# Patient Record
Sex: MALE | Race: WHITE | Employment: OTHER | URBAN - METROPOLITAN AREA
[De-identification: names, ages, dates, MRNs, and addresses within clinical notes are randomized per-mention and may not be internally consistent; named-entity substitution may affect disease eponyms.]

---

## 2019-05-17 ENCOUNTER — HOSPITAL ENCOUNTER (OUTPATIENT)
Age: 59
Setting detail: OBSERVATION
Discharge: HOME | End: 2019-05-19
Attending: EMERGENCY MEDICINE | Admitting: HOSPITALIST
Payer: OTHER GOVERNMENT

## 2019-05-17 ENCOUNTER — APPOINTMENT (OUTPATIENT)
Dept: GENERAL RADIOLOGY | Age: 59
End: 2019-05-17
Payer: OTHER GOVERNMENT

## 2019-05-17 ENCOUNTER — APPOINTMENT (OUTPATIENT)
Dept: CT IMAGING | Age: 59
End: 2019-05-17
Payer: OTHER GOVERNMENT

## 2019-05-17 DIAGNOSIS — R56.9 NEW ONSET SEIZURE (HCC): Primary | ICD-10-CM

## 2019-05-17 DIAGNOSIS — I69.30 HISTORY OF CVA WITH RESIDUAL DEFICIT: ICD-10-CM

## 2019-05-17 DIAGNOSIS — R55 SYNCOPE AND COLLAPSE: ICD-10-CM

## 2019-05-17 LAB
A/G RATIO: 1.2 (ref 1.1–2.2)
ALBUMIN SERPL-MCNC: 3.9 G/DL (ref 3.4–5)
ALP BLD-CCNC: 99 U/L (ref 40–129)
ALT SERPL-CCNC: 15 U/L (ref 10–40)
ANION GAP SERPL CALCULATED.3IONS-SCNC: 22 MMOL/L (ref 3–16)
APTT: 33.1 SEC (ref 26–36)
AST SERPL-CCNC: 15 U/L (ref 15–37)
BASOPHILS ABSOLUTE: 0.1 K/UL (ref 0–0.2)
BASOPHILS RELATIVE PERCENT: 0.8 %
BILIRUB SERPL-MCNC: 0.3 MG/DL (ref 0–1)
BILIRUBIN URINE: NEGATIVE
BLOOD, URINE: NEGATIVE
BUN BLDV-MCNC: 4 MG/DL (ref 7–20)
CALCIUM SERPL-MCNC: 10.2 MG/DL (ref 8.3–10.6)
CHLORIDE BLD-SCNC: 104 MMOL/L (ref 99–110)
CLARITY: CLEAR
CO2: 16 MMOL/L (ref 21–32)
COLOR: YELLOW
CREAT SERPL-MCNC: 1.1 MG/DL (ref 0.9–1.3)
EOSINOPHILS ABSOLUTE: 0.1 K/UL (ref 0–0.6)
EOSINOPHILS RELATIVE PERCENT: 2.1 %
GFR AFRICAN AMERICAN: >60
GFR NON-AFRICAN AMERICAN: >60
GLOBULIN: 3.3 G/DL
GLUCOSE BLD-MCNC: 122 MG/DL (ref 70–99)
GLUCOSE URINE: NEGATIVE MG/DL
HCT VFR BLD CALC: 47.7 % (ref 40.5–52.5)
HEMOGLOBIN: 16 G/DL (ref 13.5–17.5)
INR BLD: 0.97 (ref 0.86–1.14)
KETONES, URINE: NEGATIVE MG/DL
LACTIC ACID: 1.7 MMOL/L (ref 0.4–2)
LEUKOCYTE ESTERASE, URINE: NEGATIVE
LYMPHOCYTES ABSOLUTE: 3.2 K/UL (ref 1–5.1)
LYMPHOCYTES RELATIVE PERCENT: 47.6 %
MAGNESIUM: 2.1 MG/DL (ref 1.8–2.4)
MAGNESIUM: 2.1 MG/DL (ref 1.8–2.4)
MCH RBC QN AUTO: 30.4 PG (ref 26–34)
MCHC RBC AUTO-ENTMCNC: 33.6 G/DL (ref 31–36)
MCV RBC AUTO: 90.4 FL (ref 80–100)
MICROSCOPIC EXAMINATION: NORMAL
MONOCYTES ABSOLUTE: 0.5 K/UL (ref 0–1.3)
MONOCYTES RELATIVE PERCENT: 7.5 %
NEUTROPHILS ABSOLUTE: 2.8 K/UL (ref 1.7–7.7)
NEUTROPHILS RELATIVE PERCENT: 42 %
NITRITE, URINE: NEGATIVE
PDW BLD-RTO: 14 % (ref 12.4–15.4)
PH UA: 6 (ref 5–8)
PLATELET # BLD: 272 K/UL (ref 135–450)
PMV BLD AUTO: 8.6 FL (ref 5–10.5)
POTASSIUM REFLEX MAGNESIUM: 3.5 MMOL/L (ref 3.5–5.1)
PROTEIN UA: NEGATIVE MG/DL
PROTHROMBIN TIME: 11.1 SEC (ref 9.8–13)
RBC # BLD: 5.27 M/UL (ref 4.2–5.9)
SODIUM BLD-SCNC: 142 MMOL/L (ref 136–145)
SPECIFIC GRAVITY UA: 1.01 (ref 1–1.03)
TOTAL PROTEIN: 7.2 G/DL (ref 6.4–8.2)
TROPONIN: <0.01 NG/ML
URINE REFLEX TO CULTURE: NORMAL
URINE TYPE: NORMAL
UROBILINOGEN, URINE: 0.2 E.U./DL
WBC # BLD: 6.8 K/UL (ref 4–11)

## 2019-05-17 PROCEDURE — 80053 COMPREHEN METABOLIC PANEL: CPT

## 2019-05-17 PROCEDURE — 85025 COMPLETE CBC W/AUTO DIFF WBC: CPT

## 2019-05-17 PROCEDURE — 93005 ELECTROCARDIOGRAM TRACING: CPT | Performed by: EMERGENCY MEDICINE

## 2019-05-17 PROCEDURE — G0378 HOSPITAL OBSERVATION PER HR: HCPCS

## 2019-05-17 PROCEDURE — 85730 THROMBOPLASTIN TIME PARTIAL: CPT

## 2019-05-17 PROCEDURE — 84484 ASSAY OF TROPONIN QUANT: CPT

## 2019-05-17 PROCEDURE — 36415 COLL VENOUS BLD VENIPUNCTURE: CPT

## 2019-05-17 PROCEDURE — 83735 ASSAY OF MAGNESIUM: CPT

## 2019-05-17 PROCEDURE — 83605 ASSAY OF LACTIC ACID: CPT

## 2019-05-17 PROCEDURE — 70450 CT HEAD/BRAIN W/O DYE: CPT

## 2019-05-17 PROCEDURE — 96360 HYDRATION IV INFUSION INIT: CPT

## 2019-05-17 PROCEDURE — 2580000003 HC RX 258: Performed by: NURSE PRACTITIONER

## 2019-05-17 PROCEDURE — 85610 PROTHROMBIN TIME: CPT

## 2019-05-17 PROCEDURE — 6370000000 HC RX 637 (ALT 250 FOR IP): Performed by: NURSE PRACTITIONER

## 2019-05-17 PROCEDURE — 73562 X-RAY EXAM OF KNEE 3: CPT

## 2019-05-17 PROCEDURE — 83036 HEMOGLOBIN GLYCOSYLATED A1C: CPT

## 2019-05-17 PROCEDURE — 2580000003 HC RX 258: Performed by: PHYSICIAN ASSISTANT

## 2019-05-17 PROCEDURE — 71046 X-RAY EXAM CHEST 2 VIEWS: CPT

## 2019-05-17 PROCEDURE — 81003 URINALYSIS AUTO W/O SCOPE: CPT

## 2019-05-17 PROCEDURE — 80061 LIPID PANEL: CPT

## 2019-05-17 PROCEDURE — 99285 EMERGENCY DEPT VISIT HI MDM: CPT

## 2019-05-17 RX ORDER — CLOPIDOGREL BISULFATE 75 MG/1
75 TABLET ORAL DAILY
Status: DISCONTINUED | OUTPATIENT
Start: 2019-05-18 | End: 2019-05-19 | Stop reason: HOSPADM

## 2019-05-17 RX ORDER — NITROGLYCERIN 0.4 MG/1
0.4 TABLET SUBLINGUAL EVERY 5 MIN PRN
COMMUNITY
Start: 2010-05-05

## 2019-05-17 RX ORDER — ASPIRIN 81 MG/1
81 TABLET, CHEWABLE ORAL DAILY
COMMUNITY
Start: 2010-05-05

## 2019-05-17 RX ORDER — SODIUM CHLORIDE 0.9 % (FLUSH) 0.9 %
10 SYRINGE (ML) INJECTION PRN
Status: DISCONTINUED | OUTPATIENT
Start: 2019-05-17 | End: 2019-05-19 | Stop reason: HOSPADM

## 2019-05-17 RX ORDER — ATORVASTATIN CALCIUM 20 MG/1
20 TABLET, FILM COATED ORAL NIGHTLY
Status: DISCONTINUED | OUTPATIENT
Start: 2019-05-17 | End: 2019-05-18

## 2019-05-17 RX ORDER — CLOPIDOGREL BISULFATE 75 MG/1
1 TABLET ORAL DAILY
COMMUNITY

## 2019-05-17 RX ORDER — ACETAMINOPHEN 325 MG/1
650 TABLET ORAL EVERY 4 HOURS PRN
Status: DISCONTINUED | OUTPATIENT
Start: 2019-05-17 | End: 2019-05-19 | Stop reason: HOSPADM

## 2019-05-17 RX ORDER — 0.9 % SODIUM CHLORIDE 0.9 %
1000 INTRAVENOUS SOLUTION INTRAVENOUS ONCE
Status: COMPLETED | OUTPATIENT
Start: 2019-05-17 | End: 2019-05-17

## 2019-05-17 RX ORDER — METOPROLOL TARTRATE 50 MG/1
50 TABLET, FILM COATED ORAL 2 TIMES DAILY
Status: DISCONTINUED | OUTPATIENT
Start: 2019-05-17 | End: 2019-05-19 | Stop reason: HOSPADM

## 2019-05-17 RX ORDER — ASPIRIN 81 MG/1
81 TABLET, CHEWABLE ORAL DAILY
Status: DISCONTINUED | OUTPATIENT
Start: 2019-05-18 | End: 2019-05-19 | Stop reason: HOSPADM

## 2019-05-17 RX ORDER — ONDANSETRON 2 MG/ML
4 INJECTION INTRAMUSCULAR; INTRAVENOUS EVERY 6 HOURS PRN
Status: DISCONTINUED | OUTPATIENT
Start: 2019-05-17 | End: 2019-05-19 | Stop reason: HOSPADM

## 2019-05-17 RX ORDER — SODIUM CHLORIDE 0.9 % (FLUSH) 0.9 %
10 SYRINGE (ML) INJECTION EVERY 12 HOURS SCHEDULED
Status: DISCONTINUED | OUTPATIENT
Start: 2019-05-17 | End: 2019-05-19 | Stop reason: HOSPADM

## 2019-05-17 RX ADMIN — ATORVASTATIN CALCIUM 20 MG: 20 TABLET, FILM COATED ORAL at 23:52

## 2019-05-17 RX ADMIN — Medication 10 ML: at 23:52

## 2019-05-17 RX ADMIN — SODIUM CHLORIDE 1000 ML: 9 INJECTION, SOLUTION INTRAVENOUS at 20:02

## 2019-05-17 RX ADMIN — METOPROLOL TARTRATE 50 MG: 50 TABLET ORAL at 23:52

## 2019-05-17 ASSESSMENT — PAIN DESCRIPTION - ORIENTATION: ORIENTATION: LEFT

## 2019-05-17 ASSESSMENT — PAIN DESCRIPTION - FREQUENCY: FREQUENCY: CONTINUOUS

## 2019-05-17 ASSESSMENT — PAIN DESCRIPTION - PAIN TYPE: TYPE: ACUTE PAIN

## 2019-05-17 ASSESSMENT — PAIN DESCRIPTION - DESCRIPTORS: DESCRIPTORS: ACHING;CONSTANT

## 2019-05-17 ASSESSMENT — PAIN DESCRIPTION - ONSET: ONSET: ON-GOING

## 2019-05-17 ASSESSMENT — PAIN SCALES - GENERAL
PAINLEVEL_OUTOF10: 0
PAINLEVEL_OUTOF10: 5

## 2019-05-17 ASSESSMENT — PAIN DESCRIPTION - LOCATION: LOCATION: KNEE

## 2019-05-17 NOTE — ED PROVIDER NOTES
room.  Mikaela Alvarenga is one who found the patient. Roommate states he heard the thud on the floor he went immediately to attend the patient. Patient was seizing. Patient went to call EMS. Upon returning the seizing activity had diminished. He was frothing from the right mouth and the patient rolled him on the right side for airway protection. History of seizure disorder. Nursing Notes were all reviewed and agreed with or any disagreements were addressed  in the HPI. REVIEW OF SYSTEMS    (2-9 systems for level 4, 10 or more for level 5)     Review of Systems    Positives and Pertinent negatives as per HPI. Except as noted abovein the ROS, all other systems were reviewed and negative. PAST MEDICAL HISTORY     Past Medical History:   Diagnosis Date    CAD (coronary artery disease)     CVA [facility]cerebral vascular accident) ([facility] [address]     Nicotine addiction          SURGICAL HISTORY     Past Surgical History:   Procedure Laterality Date    CARDIAC CATHETERIZATION  2010         [address]       Current Discharge Medication List      CONTINUE these medications which have NOT CHANGED    Details   aspirin 81 MG chewable tablet Take 81 mg by mouth daily      clopidogrel (PLAVIX) 75 MG tablet Take 1 tablet by mouth daily       SERTRALINE HCL PO Take 1 tablet by mouth daily      Cholecalciferol (VITAMIN D PO) Take 1 tablet by mouth daily      METOPROLOL TARTRATE PO Take 1 tablet by mouth 2 times daily      ROSUVASTATIN CALCIUM PO Take 0.5 tablets by mouth      nitroGLYCERIN (NITROSTAT) 0.4 MG SL tablet Place 0.4 mg under the tongue every 5 minutes as needed for Chest pain               ALLERGIES     Patient has no known allergies. FAMILYHISTORY     History reviewed. No pertinent family history.        SOCIAL HISTORY       Social History     Socioeconomic History    Marital status: Single     Spouse name: None    Number of children: None    Years of education: None    Highest education level: None Occupational History    None   Social Needs    Financial resource strain: None    Food insecurity:     Worry: None     Inability: None    Transportation needs:     Medical: None     Non-medical: None   Tobacco Use    Smoking status: Current Every Day Smoker     Packs/day: 1.00    Smokeless tobacco: Never Used   Substance and Sexual Activity    Alcohol use: Yes     Comment: occasionally     Drug use: Never    Sexual activity: Not Currently   Lifestyle    Physical activity:     Days per week: None     Minutes per session: None    Stress: None   Relationships    Social connections:     Talks on phone: None     Gets together: None     Attends [employer] service: None     Active member of club or organization: None     Attends meetings of clubs or organizations: None     Relationship status: None    Intimate partner violence:     Fear of current or ex partner: None     Emotionally abused: None     Physically abused: None     Forced sexual activity: None   Other Topics Concern    None   Social History Narrative    None       SCREENINGS    [place] Coma Scale  Eye Opening: Spontaneous  Best Verbal Response: Oriented  Best Motor Response: Obeys commands  [place] Coma Scale Score: 15        PHYSICAL EXAM    (up to 7 for level 4, 8 or more for level 5)     ED Triage Vitals [05/17/19 1813]   BP Temp Temp src Pulse Resp SpO2 Height Weight   -- -- -- 78 -- -- -- --       Physical Exam   Constitutional: He is oriented to person, place, and time. He appears well-developed and well-nourished. HENT:   Head: Normocephalic and atraumatic. Right Ear: External ear normal.   Left Ear: External ear normal.   Eyes: Conjunctivae are normal. Right eye exhibits no discharge. Left eye exhibits no discharge. No scleral icterus. Neck: Normal range of motion. Neck supple. Normal carotid pulses present. Carotid bruit is not present. Cardiovascular: Normal rate, regular rhythm and normal heart sounds.    Pulmonary/Chest: [address]   Phone [phone]   CBC    Narrative:     Performed at:  [facility]  [address]   Phone [phone]   TSH WITH REFLEX    Narrative:     Performed at:  [facility]  [address]   Phone [phone]   CK    Narrative:     Performed at:  [facility]  [address] [facility] [address]   Phone [phone]       All other labs were within normal range or not returned as of this dictation. EKG: All EKG's are interpreted by the Emergency Department Physician who either signs orCo-signs this chart in the absence of a cardiologist.  Please see their note for interpretation of EKG. RADIOLOGY:   Non-plain film images such as CT, Ultrasound and MRI are read by the radiologist. Lizzy Casanova radiographic images are visualized andpreliminarily interpreted by the  ED Provider with the below findings:    Chest x-ray shows no acute cardiopulmonary process. Head CT scan shows no acute process. X-ray left knee shows no acute osseous abnormality. Interpretation pert Radiologist below, if available at the time of this note:    XR KNEE LEFT (3 VIEWS)   Final Result   No acute findings         CT Head WO Contrast   Final Result   No acute intracranial abnormality. XR CHEST STANDARD (2 VW)   Final Result   No acute findings         NM Cardiac Stress Test Nuclear Imaging    (Results Pending)     No results found.        PROCEDURES   Unless otherwise noted below, none     Procedures    CRITICAL CARE TIME   N/A    CONSULTS:  IP CONSULT TO NEUROLOGY  IP CONSULT TO DIETITIAN  IP CONSULT TO CARDIOLOGY      EMERGENCY DEPARTMENT COURSE and DIFFERENTIALDIAGNOSIS/MDM:   Vitals:    Vitals:    05/18/19 0430 05/18/19 0818 05/18/19 1212 05/18/19 1428   BP: 132/82 129/78 137/87 123/83   Pulse: 74 84 82 66   Resp: 14 16 16 18   Temp: 97.9 °F (36.6 °C) 97.9 °F (36.6 °C)  98 °F (36.7 °C)   TempSrc: Oral Oral  Oral   SpO2: 94% 93%  94%   Weight: 154 lb 8.7 oz (70.1 kg)      Height:           Patient was given thefollowing medications:  Medications   aspirin chewable tablet 81 mg (81 mg Oral Given 5/18/19 0822)   vitamin D (CHOLECALCIFEROL) tablet 1,000 Units (1,000 Units Oral Given 5/18/19 0796)   clopidogrel (PLAVIX) tablet 75 mg (75 mg Oral Given 5/18/19 0822)   metoprolol tartrate (LOPRESSOR) tablet 50 mg (50 mg Oral Given 5/18/19 0822)   sodium chloride flush 0.9 % injection 10 mL (10 mLs Intravenous Given 5/18/19 0825)   sodium chloride flush 0.9 % injection 10 mL (has no administration in time range)   magnesium hydroxide (MILK OF MAGNESIA) 400 MG/5ML suspension 30 mL (has no administration in time range)   ondansetron (ZOFRAN) injection 4 mg (has no administration in time range)   enoxaparin (LOVENOX) injection 40 mg (40 mg Subcutaneous Given 5/18/19 3676)   acetaminophen (TYLENOL) tablet 650 mg (has no administration in time range)   regadenoson (LEXISCAN) injection 0.4 mg (has no administration in time range)   atorvastatin (LIPITOR) tablet 40 mg (has no administration in time range)   0.9 % sodium chloride bolus (0 mLs Intravenous Stopped 5/17/19 2112)       The patient presenting by EMS for witnessed new-onset seizure occurring earlier this evening. Patient without known history of seizure disorder. Patient with known history of CVA occurring August, 2018. He relates the CVA due to possible carotid disease. He does have known CAD with stent placement as well as PVD with bilateral stent placement for lower extremity perfusion. At this time it cannot be clearly determined if the patient has had a TIA event. Laboratory studies show no evidence UTI. The patient's WBC 6.8 hemoglobin 16.0. The patient's CMP shows normal renal and hepatic function. Blood sugar 122. The patient's troponin less than 0.01.   The patient's coag studies are within ranges. The patient magnesium is 2.1. Lactate study pending. Emergency department treatment 1 L saline. I did review case with attending physician who recommends admission for further evaluation of new onset seizure/syncopal event. Cannot exclude TIA at this time. Hospitalist will be contacted for admission. FINAL IMPRESSION      1. New onset seizure ([facility] [address]    2. Syncope and collapse    3. History of CVA with residual deficit          DISPOSITION/PLAN   DISPOSITION Admitted 05/17/2019 10:19:35 PM      PATIENT REFERREDTO:  No follow-up provider specified. DISCHARGE MEDICATIONS:  Current Discharge Medication List          DISCONTINUED MEDICATIONS:  Current Discharge Medication List                 (Please note that portions ofthis note were completed with a voice recognition program.  Efforts were made to edit the dictations but occasionally words are mis-transcribed. )    Suzanna Terrell PA-C (electronically signed)           Suzanna Terrell PA-C  05/18/19 5082

## 2019-05-17 NOTE — ED NOTES
Patient brought in by EMS after being found by roommate unresponsive. Patients roommate says Melanie Gonzalez heard a loud noise in the bathroom and found patient unresponsive on the floor\". Roommate describes when he found him he was having seizure like activity. Patient alert and oriented x 4 with stable vitals. Admits to left knee pain from the fall.       Ajith Monge RN  05/17/19 2199

## 2019-05-17 NOTE — ED NOTES
Bed: A-16  Expected date:   Expected time:   Means of arrival: [facility]  Comments:  Semi-conscious male     Patrai Rubio RN  05/17/19 0032

## 2019-05-18 LAB
ANION GAP SERPL CALCULATED.3IONS-SCNC: 13 MMOL/L (ref 3–16)
BUN BLDV-MCNC: 4 MG/DL (ref 7–20)
CALCIUM SERPL-MCNC: 9.9 MG/DL (ref 8.3–10.6)
CHLORIDE BLD-SCNC: 108 MMOL/L (ref 99–110)
CHOLESTEROL, FASTING: 87 MG/DL (ref 0–199)
CO2: 22 MMOL/L (ref 21–32)
CREAT SERPL-MCNC: 0.7 MG/DL (ref 0.9–1.3)
EKG ATRIAL RATE: 71 BPM
EKG DIAGNOSIS: NORMAL
EKG P AXIS: 64 DEGREES
EKG P-R INTERVAL: 160 MS
EKG Q-T INTERVAL: 400 MS
EKG QRS DURATION: 98 MS
EKG QTC CALCULATION (BAZETT): 434 MS
EKG R AXIS: 74 DEGREES
EKG T AXIS: -43 DEGREES
EKG VENTRICULAR RATE: 71 BPM
ESTIMATED AVERAGE GLUCOSE: 131.2 MG/DL
GFR AFRICAN AMERICAN: >60
GFR NON-AFRICAN AMERICAN: >60
GLUCOSE BLD-MCNC: 100 MG/DL (ref 70–99)
HBA1C MFR BLD: 6.2 %
HCT VFR BLD CALC: 45.2 % (ref 40.5–52.5)
HDLC SERPL-MCNC: 33 MG/DL (ref 40–60)
HEMOGLOBIN: 15.4 G/DL (ref 13.5–17.5)
LDL CHOLESTEROL CALCULATED: 25 MG/DL
MCH RBC QN AUTO: 30.5 PG (ref 26–34)
MCHC RBC AUTO-ENTMCNC: 34.2 G/DL (ref 31–36)
MCV RBC AUTO: 89.2 FL (ref 80–100)
PDW BLD-RTO: 13.8 % (ref 12.4–15.4)
PLATELET # BLD: 254 K/UL (ref 135–450)
PMV BLD AUTO: 8.6 FL (ref 5–10.5)
POTASSIUM REFLEX MAGNESIUM: 3.8 MMOL/L (ref 3.5–5.1)
RBC # BLD: 5.07 M/UL (ref 4.2–5.9)
SODIUM BLD-SCNC: 143 MMOL/L (ref 136–145)
TOTAL CK: 205 U/L (ref 39–308)
TRIGLYCERIDE, FASTING: 147 MG/DL (ref 0–150)
TROPONIN: 0.04 NG/ML
TROPONIN: 0.1 NG/ML
TROPONIN: 0.11 NG/ML
TSH REFLEX: 2.3 UIU/ML (ref 0.27–4.2)
VLDLC SERPL CALC-MCNC: 29 MG/DL
WBC # BLD: 8.1 K/UL (ref 4–11)

## 2019-05-18 PROCEDURE — G0378 HOSPITAL OBSERVATION PER HR: HCPCS

## 2019-05-18 PROCEDURE — 80048 BASIC METABOLIC PNL TOTAL CA: CPT

## 2019-05-18 PROCEDURE — 94760 N-INVAS EAR/PLS OXIMETRY 1: CPT

## 2019-05-18 PROCEDURE — 96372 THER/PROPH/DIAG INJ SC/IM: CPT

## 2019-05-18 PROCEDURE — 6370000000 HC RX 637 (ALT 250 FOR IP): Performed by: INTERNAL MEDICINE

## 2019-05-18 PROCEDURE — 36415 COLL VENOUS BLD VENIPUNCTURE: CPT

## 2019-05-18 PROCEDURE — 93005 ELECTROCARDIOGRAM TRACING: CPT | Performed by: NURSE PRACTITIONER

## 2019-05-18 PROCEDURE — 97162 PT EVAL MOD COMPLEX 30 MIN: CPT

## 2019-05-18 PROCEDURE — 85027 COMPLETE CBC AUTOMATED: CPT

## 2019-05-18 PROCEDURE — 6370000000 HC RX 637 (ALT 250 FOR IP): Performed by: NURSE PRACTITIONER

## 2019-05-18 PROCEDURE — 84443 ASSAY THYROID STIM HORMONE: CPT

## 2019-05-18 PROCEDURE — 6360000002 HC RX W HCPCS: Performed by: NURSE PRACTITIONER

## 2019-05-18 PROCEDURE — 97535 SELF CARE MNGMENT TRAINING: CPT

## 2019-05-18 PROCEDURE — 84484 ASSAY OF TROPONIN QUANT: CPT

## 2019-05-18 PROCEDURE — 82550 ASSAY OF CK (CPK): CPT

## 2019-05-18 PROCEDURE — 97166 OT EVAL MOD COMPLEX 45 MIN: CPT

## 2019-05-18 PROCEDURE — 97116 GAIT TRAINING THERAPY: CPT

## 2019-05-18 PROCEDURE — 2580000003 HC RX 258: Performed by: NURSE PRACTITIONER

## 2019-05-18 PROCEDURE — 99223 1ST HOSP IP/OBS HIGH 75: CPT | Performed by: INTERNAL MEDICINE

## 2019-05-18 PROCEDURE — 93010 ELECTROCARDIOGRAM REPORT: CPT | Performed by: INTERNAL MEDICINE

## 2019-05-18 RX ORDER — ATORVASTATIN CALCIUM 40 MG/1
40 TABLET, FILM COATED ORAL NIGHTLY
Status: DISCONTINUED | OUTPATIENT
Start: 2019-05-18 | End: 2019-05-19 | Stop reason: HOSPADM

## 2019-05-18 RX ADMIN — ATORVASTATIN CALCIUM 40 MG: 40 TABLET, FILM COATED ORAL at 20:31

## 2019-05-18 RX ADMIN — METOPROLOL TARTRATE 50 MG: 50 TABLET ORAL at 20:31

## 2019-05-18 RX ADMIN — VITAMIN D, TAB 1000IU (100/BT) 1000 UNITS: 25 TAB at 08:22

## 2019-05-18 RX ADMIN — CLOPIDOGREL BISULFATE 75 MG: 75 TABLET ORAL at 08:22

## 2019-05-18 RX ADMIN — ASPIRIN 81 MG 81 MG: 81 TABLET ORAL at 08:22

## 2019-05-18 RX ADMIN — METOPROLOL TARTRATE 50 MG: 50 TABLET ORAL at 08:22

## 2019-05-18 RX ADMIN — Medication 10 ML: at 08:25

## 2019-05-18 RX ADMIN — ENOXAPARIN SODIUM 40 MG: 40 INJECTION SUBCUTANEOUS at 08:22

## 2019-05-18 RX ADMIN — Medication 10 ML: at 20:31

## 2019-05-18 ASSESSMENT — PAIN SCALES - GENERAL
PAINLEVEL_OUTOF10: 0
PAINLEVEL_OUTOF10: 0

## 2019-05-18 NOTE — PLAN OF CARE
Problem: Falls - Risk of:  Goal: Will remain free from falls  Description  Will remain free from falls  Outcome: Ongoing  Fall risk precautions in place. Bed in lowest position with wheels locked,bed alarm in place and activated, non-skid socks on pt, fall risk ID on pt, call light in reach, pt encouraged to call before getting out of bed and for any other needs or complaints. Problem: Risk for Impaired Skin Integrity  Goal: Tissue integrity - skin and mucous membranes  Description  Structural intactness and normal physiological function of skin and  mucous membranes. Outcome: Ongoing  Skin assessment completed every shift. Pt assessed for incontinence, appropriate barrier cream applied prn. Pt encouraged to turn/rotate every 2 hours. Assistance provided if pt unable to do so themselves. Problem: Daily Care:  Goal: Daily care needs are met  Description  Daily care needs are met  Outcome: Ongoing  Patient's ability assessed to perform self care and independent activity encouraged according to that ability. Assisted with ADL's as needed. Risk for skin breakdown assessed. Potential discharge needs assessed. Patient and family included in daily care decisions.

## 2019-05-18 NOTE — PROGRESS NOTES
4 Eyes Skin Assessment     The patient is being assess for  Admission    I agree that 2 RN's have performed a thorough Head to Toe Skin Assessment on the patient. ALL assessment sites listed below have been assessed. Areas assessed by both nurses:   [x]   Head, Face, and Ears   [x]   Shoulders, Back, and Chest  [x]   Arms, Elbows, and Hands   [x]   Coccyx, Sacrum, and IschIum  [x]   Legs, Feet, and Heels        Does the Patient have Skin Breakdown?   No         Chandan Prevention initiated: Yes   Wound Care Orders initiated:  NA      [facility] nurse consulted for Pressure Injury (Stage 3,4, Unstageable, DTI, NWPT, and Complex wounds), New and Established Ostomies:  NA      Nurse 1 eSignature: Electronically signed by Marylou Larson RN on 5/18/19 at 12:29 AM    **SHARE this note so that the co-signing nurse is able to place an eSignature**    Nurse 2 eSignature: Electronically signed by Haider Padron RN on 5/18/19 at 12:30 AM

## 2019-05-18 NOTE — PROGRESS NOTES
Occupational Therapy   Occupational Therapy Initial Assessment  Date: 2019   Patient Name: Queenie Negrete  MRN: [id number]     : 1960    Date of Service: 2019    Assessment: Pt is 66 y.o. M who presents following syncope and collapse episode with reported seizure-like activity. PTA pt lives with roommate in [facility] with 4 SIMON. Pt reports independence in self-care and functional mobility with hemiwalker and has assistance from roommate for homemaking responsibilities. Currently, pt presents with ROM/strength in [facility] with functional impairment (little AROM) d/t previous CVA in 2018. Pt completed bed mobility with SBA and sit <> stand transfers with SBA/CGA. Pt completed toileting and grooming tasks with SBA and increased time. Pt completed functional mobility with QC with SBA/CGA. Anticipate pt is functioning close to baseline - benefiting from [facility] for ADL needs upon d/c. Pt would benefit from continued skilled therapy in outpatient setting to address deficits from CVA. Anticipate pt to d/c home with prior level of assistance from roommate. Discharge Recommendations:  Home with assist PRN, Outpatient OT       Assessment   Performance deficits / Impairments: Decreased functional mobility ; Decreased strength;Decreased endurance;Decreased ADL status; Decreased balance  Assessment: Pt is 66 y.o. M who presents following syncope and collapse episode with reported seizure-like activity. [place] Pt reports independence in self-care and functional mobility with hemiwalker and has assistance from roommate for homemaking responsibilities. Currently, pt presents with ROM/strength in [facility] with functional impairment (little AROM) d/t previous CVA in 2018. Pt completed bed mobility with SBA and sit <> stand transfers with SBA/CGA. Pt completed toileting and grooming tasks with SBA and increased time.  Pt completed functional mobility with QC with SBA/CGA. Anticipate pt is functioning close to baseline - benefiting from [facility] for ADL needs upon d/c. Pt would benefit from continued skilled therapy in outpatient setting to address deficits from CVA. Anticipate pt to d/c home with prior level of assistance from roommate. Treatment Diagnosis: impaired func mob, transfers, balance and ADL status   Prognosis: Good  Decision Making: Medium Complexity  History: PMH: CVA resulting in L weakness in Aug 2018  Exam: ADLs, transfers, func mob, bed mob  Assistance / Modification: SBA/CGA for mobility, SBA/CGA for ADLs  Patient Education: Role of OT, POC, safety, continued Outpatient therapy   REQUIRES OT FOLLOW UP: Yes  Activity Tolerance  Activity Tolerance: Patient Tolerated treatment well  Safety Devices  Safety Devices in place: Yes(ЕКАТЕРИНА Ryan) aware)  Type of devices: Left in bed;Call light within reach;Nurse notified; Bed alarm in place           Patient Diagnosis(es): The primary encounter diagnosis was New onset seizure ([facility] [address] Diagnoses of Syncope and collapse and History of CVA with residual deficit were also pertinent to this visit. has no past medical history on file. has no past surgical history on file. Treatment Diagnosis: impaired func mob, transfers, balance and ADL status       Restrictions  Restrictions/Precautions  Restrictions/Precautions: Fall Risk    Subjective   General  Chart Reviewed: Yes  Patient assessed for rehabilitation services?: Yes  Additional Pertinent Hx: Per Zoraida Ruggiero APRN-CNP 5/17/19: Pt is \"66 y.o. male with PMHx of CVA s/p MCA thrombectomy 8/2018, PVD, HTN, HLD presented to [facility] after syncopal episode this afternoon. Pt does not recall the events leading or just after the syncope. He remembers going to the bathroom and the next thing he remembers is being in the back of the ambulance. Apparently, the patients room mate heard a loud noise and found him on the floor.   Room mate told the ED he had seizure like activity. Pt has no history of seizures. \"  Family / Caregiver Present: No  Referring Practitioner: LEONIDES Stein  Diagnosis: Syncope and collapse  Subjective  Subjective: Pt met bedside, requesting to use restroom. Agreeable to OOB activity and therapy evaluation. Oxygen Therapy  SpO2: 94 %  O2 Device: None (Room air)     Social/Functional History  Social/Functional History  Lives With: Friend(s)(roommate home all the time)  Type of Home: Trailer  Home Layout: One level  Home Access: Stairs to enter with rails  Entrance Stairs - Number of Steps: 4  Entrance Stairs - Rails: Both  Bathroom Shower/Tub: Tub/Shower unit, Shower chair with back  Bathroom Toilet: Standard(sink close by)  Bathroom Equipment: Shower chair, Hand-held shower  Home Equipment: Quad cane, [employer], Reacher(luis alberto-walker)  ADL Assistance: Independent  Homemaking Assistance: (roommate)  Ambulation Assistance: Independent(uses QC all the time)  Transfer Assistance: Independent  Active : Yes  Occupation: On disability  Additional Comments: the pt denies recent falls       Objective   Vision: Within Functional Limits  Hearing: Within functional limits      Orientation  Overall Orientation Status: Within Functional Limits     Balance  Sitting Balance: Supervision  Standing Balance: Stand by assistance  Standing Balance  Time: ~3-4 minutes   Activity: Func mob, transfers, ADL tasks     Functional Mobility  Functional - Mobility Device: (Quad cane)  Activity: To/from bathroom  Assist Level: Contact guard assistance  Functional Mobility Comments: Pt completed functional mobility with quad cane with SBA/CGA. Pt steady with no overt LOB, L side weakness from previous CVA.      Toilet Transfers  Toilet - Technique: Ambulating(QC)  Equipment Used: Grab bars(Comfort height commode)  Toilet Transfer: Stand by assistance;Contact guard assistance  Toilet Transfers Comments: SBA/CGA for sit <> stand with use of rail     ADL  Grooming: (Pt used RUE to bring LUE into sink - washed bilateral hands with SBA for balance)  Toileting: Stand by assistance; Increased time to complete(Pt managed clothing up and down and completed pericare with RUE and SBA, increased time )  Additional Comments: PTA pt reports independence in self-care tasks. Anticipate pt to be functioning close to baseline - may benefit from [facility] for bathing/dressing/toileting upon d/c. Tone RUE  RUE Tone: Normotonic  Tone LUE  LUE Tone: Hypotonic  Coordination  Movements Are Fluid And Coordinated: No  Coordination and Movement description: Left UE;Fine motor impairments;Gross motor impairments  Quality of Movement Other  Comment: LUE difficulties from previous CVA in August 2018     Bed mobility  Supine to Sit: Stand by assistance(bed flat, no rail and increased time and effort)  Sit to Supine: Stand by assistance(bed flat and no rail)  Scooting: Stand by assistance(with cues to straighten self out in the bed)     Transfers  Sit to stand: Stand by assistance;Contact guard assistance  Stand to sit: Stand by assistance;Contact guard assistance  Transfer Comments: SBA/CGA for sit <> stand from EOB      Cognition  Overall Cognitive Status: Exceptions  Arousal/Alertness: Appropriate responses to stimuli  Following Commands:  Follows one step commands consistently  Attention Span: Appears intact  Memory: Decreased recall of recent events  Safety Judgement: Decreased awareness of need for safety;Decreased awareness of need for assistance  Initiation: Requires cues for some  Cognition Comment: the pt is abrupt and definately set in his ways; resistant to education        Sensation  Overall Sensation Status: (the pt denies decreased sensation)        LUE AROM (degrees)  LUE General AROM: Previous CVA - little to no AROM   Left Hand AROM (degrees)  Left Hand General AROM: Previous CVA - little to no AROM   RUE AROM (degrees)  RUE AROM : WFL  Right Hand AROM (degrees)  Right Hand AROM: [facility] LUE Strength  LUE Strength Comment: Previous CVA - little to no strength  RUE Strength  Gross RUE Strength: WFL          Plan   Plan  Times per week: 3-5  Times per day: Daily  Current Treatment Recommendations: Strengthening, Functional Mobility Training, Endurance Training, Balance Training, Safety Education & Training, Equipment Evaluation, Education, & procurement, Patient/Caregiver Education & Training, Self-Care / ADL    AM-PAC Score    Zacarias Liu scored a 20/24 on the AM[facility] ADL Inpatient form. Current research shows that an AM-PAC score of 18 or greater is typically associated with a discharge to the patient's home setting. Based on the patients AM-PAC score and their current ADL deficits, it is recommended that the patient have 2-3 sessions per week of Occupational Therapy at d/c to increase the patients independence. AM-PAC Inpatient Daily Activity Raw Score: 20  AM-PAC Inpatient ADL T-Scale Score : 42.03  ADL Inpatient CMS 0-100% Score: 38.32  ADL Inpatient CMS G-Code Modifier : CJ    Goals  Short term goals  Time Frame for Short term goals: prior to d/c  Short term goal 1: Pt will complete functional mobility and transfers mod I  Short term goal 2: Pt will complete bathing mod I  Short term goal 3: Pt will complete dressing mod I  Short term goal 4: Pt will complete toileting mod I  Short term goal 5: Pt will complete grooming mod I   Patient Goals   Patient goals : \"get back home\"       Therapy Time   Individual Concurrent Group Co-treatment   Time In 1412         Time Out 1430         Minutes 18         Timed Code Treatment Minutes: 8 Minutes(10 min eval )     If pt is discharged prior to next OT session, this note will serve as the discharge summary.     Manuel Coe

## 2019-05-18 NOTE — CONSULTS
Referring Physician: Dr. Otf Alcantar  Reason for Consultation: Elevated troponin  Chief Complaint: \"I guess I passed out\"    Subjective:   History of Present Illness:  Royce More is a 66 y.o. patient who presented to the hospital with complaints of loss of consciousness. He reports being is his usual state of health until his event yesterday. He remembers walking to the bathroom and urinating. He does not recall a prodrome or walking out of the bathroom but \"passed out. \" Reportedly his roommate heard the patient hit the ground and had seizure like activity. The patient does not recall any details until EMS arrived. At that point, he said he felt \"normal.\" He denies a history of seizures or syncope. He had an MI in 2010 with PCI to Lcx. He denies any recent chest pains. He has chronic MARTINEZ and continues to smoke but denies any acute changes. Patient denies dyspnea at rest, PND, orthopnea, palpitations, lightheadedness, weight changes, and LE edema. Past Medical History:   has a past medical history of CAD (coronary artery disease), CVA (cerebral vascular accident) ([facility] [address] and Nicotine addiction. Surgical History:   has a past surgical history that includes Cardiac catheterization (2010). Social History:   reports that he has been smoking. He has been smoking about 1.00 pack per day. He has never used smokeless tobacco. He reports that he drinks alcohol. He reports that he does not use drugs. Family History:  Denies premature CAD. Home Medications:  Were reviewed and are listed in nursing record and/or below  Prior to Admission medications    Medication Sig Start Date End Date Taking?  Authorizing Provider   aspirin 81 MG chewable tablet Take 81 mg by mouth daily 5/5/10  Yes Historical Provider, MD   clopidogrel (PLAVIX) 75 MG tablet Take 1 tablet by mouth daily    Yes Historical Provider, MD   SERTRALINE HCL PO Take 1 tablet by mouth daily   Yes Historical Provider, MD   Cholecalciferol (VITAMIN D PO) Take 1 tablet by mouth daily   Yes Historical Provider, MD   METOPROLOL TARTRATE PO Take 1 tablet by mouth 2 times daily   Yes Historical Provider, MD   ROSUVASTATIN CALCIUM PO Take 0.5 tablets by mouth   Yes Historical Provider, MD   nitroGLYCERIN (NITROSTAT) 0.4 MG SL tablet Place 0.4 mg under the tongue every 5 minutes as needed for Chest pain 5/5/10   Historical Provider, MD        CURRENT Medications:    regadenoson (LEXISCAN) injection 0.4 mg ONCE PRN   aspirin chewable tablet 81 mg Daily   vitamin D (CHOLECALCIFEROL) tablet 1,000 Units Daily   clopidogrel (PLAVIX) tablet 75 mg Daily   metoprolol tartrate (LOPRESSOR) tablet 50 mg BID   atorvastatin (LIPITOR) tablet 20 mg Nightly   sodium chloride flush 0.9 % injection 10 mL 2 times per day   sodium chloride flush 0.9 % injection 10 mL PRN   magnesium hydroxide (MILK OF MAGNESIA) 400 MG/5ML suspension 30 mL Daily PRN   ondansetron (ZOFRAN) injection 4 mg Q6H PRN   enoxaparin (LOVENOX) injection 40 mg Daily   acetaminophen (TYLENOL) tablet 650 mg Q4H PRN     Allergies:  Patient has no known allergies. Review of Systems:   · Constitutional: no unanticipated weight loss. There's been no change in energy level, sleep pattern, or activity level. No fevers, chills. · Eyes: No visual changes or diplopia. No scleral icterus. · ENT: No Headaches, hearing loss or vertigo. No mouth sores or sore throat. · Cardiovascular: as reviewed in HPI  · Respiratory: +cough. No wheezing, no sputum production. No hematemesis. · Gastrointestinal: No abdominal pain, appetite loss, blood in stools. No change in bowel or bladder habits. · Genitourinary: No dysuria, trouble voiding, or hematuria. · Musculoskeletal:  No gait disturbance, no joint complaints. · Integumentary: No rash or pruritis. · Neurological: No headache, diplopia, change in muscle strength, numbness or tingling. · Psychiatric: No anxiety or depression. · Endocrine: No temperature intolerance.  No excessive thirst, fluid intake, or urination. No tremor. · Hematologic/Lymphatic: No abnormal bruising or bleeding, blood clots or swollen lymph nodes. · Allergic/Immunologic: No nasal congestion or hives. Objective:   PHYSICAL EXAM:    Vitals:    05/18/19 1428   BP: 123/83   Pulse: 66   Resp: 18   Temp: 98 °F (36.7 °C)   SpO2: 94%    Weight: 154 lb 8.7 oz (70.1 kg)       General Appearance:  Alert, cooperative, no distress, disheveled. Head:  Normocephalic, without obvious abnormality, atraumatic. Eyes:  Pupils equal and round. No scleral icterus. Mouth: Moist mucosa, no pharyngeal erythema. Nose: Nares normal. No drainage or sinus tenderness. Neck: Supple, symmetrical, trachea midline. No adenopathy. No tenderness/mass/nodules. No carotid bruit or elevated JVD. Lungs:   Clear to auscultation bilaterally, respirations unlabored. No wheeze, rales, or rhonchi. Chest Wall:  No tenderness or deformity. Heart:  Regular rate. S1/S2 normal. No murmur, rub, or gallop. Abdomen:   Soft, non-tender, bowel sounds active. Musculoskeletal: No muscle wasting or digital clubbing. Extremities: Extremities normal, atraumatic. No cyanosis or edema. Pulses: 2+ radial and carotid pulses, symmetric. Skin: No rashes or lesions. Pysch: Normal mood and affect. Alert and oriented x 4.    Neurologic: Normal gross motor and sensory exam.       Labs     CBC:   Lab Results   Component Value Date    WBC 8.1 05/18/2019    RBC 5.07 05/18/2019    HGB 15.4 05/18/2019    HCT 45.2 05/18/2019    MCV 89.2 05/18/2019    RDW 13.8 05/18/2019     05/18/2019     CMP:  Lab Results   Component Value Date     05/18/2019    K 3.8 05/18/2019     05/18/2019    CO2 22 05/18/2019    BUN 4 05/18/2019    CREATININE 0.7 05/18/2019    GFRAA >60 05/18/2019    GFRAA >60 05/03/2010    AGRATIO 1.2 05/17/2019    LABGLOM >60 05/18/2019    GLUCOSE 100 05/18/2019    PROT 7.2 05/17/2019    PROT 7.6 05/03/2010    CALCIUM 9.9 2019    BILITOT 0.3 2019    ALKPHOS 99 2019    AST 15 2019    ALT 15 2019     PT/INR:  No results found for: PTINR  HgBA1c:  Lab Results   Component Value Date    LABA1C 6.2 2019     Lab Results   Component Value Date    TROPONINI 0.04 (H) 2019       Cardiac Data     EK19   Sinus rhythm with Premature supraventricular complexes. Cannot rule out anterior infarct. Nonspecific ST abnormality. Echo: 2018  -Left ventricle: The cavity size was normal. Wall thickness was increased in a pattern of moderate to severe LVH. Systolic function was normal. The estimated ejection fraction was in the range of 50% to 55%. Wall motion was normal; there were no regional wall motion abnormalities. Left ventricular diastolic function parameters were normal.  - Right ventricle: Systolic function was normal by objective interpretation. TAPSE: 1.8cm. Tricuspid annular systolic [id number]. - Atrial septum: Echo contrast study showed a moderate to large right-to-left atrial level shunt, following an increase in RA pressure induced by provocative maneuvers. - Pulmonary arteries: Systolic pressure could not be accurately estimated. Tele: sinus     Cath: 2010  TEAGAN to Lcx    Assessment and Plan   1) Syncope and collapse. Etiology uncertain. Patient does not recall details but occurred after micturition so vasovagal is possible. The roommate reports seizure-like activity and the patient has a history of CVA so seizure seems possible. Neurology consulted. No arrhythmia thus far on telemetry. Will repeat echocardiogram and likely arrange for an outpatient event monitor. 2) Demand ischemia. ACS seems unlikely as he denies chest pains. An arrhythmia or seizure could elevate troponin. Will perform stress test on Monday. 3) CAD. Asymptomatic. Continue with medical management and risk factor modification including aspirin, statin, and B-blocker. 4) Nicotine Addiction. Encouraged smoking cessation but patient is in the contemplative stage. 5) History of CVA. Increase statin to high intensity dosing. Thank you for allowing us to participate in the care of iMrna Chow. Chary Boo.  Luz Teresita, [address] [place] [address]  5/18/2019 3:51 PM

## 2019-05-18 NOTE — PROGRESS NOTES
baseline  Prognosis: Good  Decision Making: Medium Complexity  History: see above   Exam: see above  Clinical Presentation: evolving  Patient Education: role of acute care PT  Barriers to Learning: resistant to education  REQUIRES PT FOLLOW UP: Yes  Activity Tolerance  Activity Tolerance: Patient Tolerated treatment well       Patient Diagnosis(es): The primary encounter diagnosis was New onset seizure ([address] Diagnoses of Syncope and collapse and History of CVA with residual deficit were also pertinent to this visit. has no past medical history on file. has no past surgical history on file. Restrictions  Restrictions/Precautions  Restrictions/Precautions: Fall Risk  Vision/Hearing  Vision: Within Functional Limits  Hearing: Within functional limits     Subjective  General  Chart Reviewed: Yes  Additional Pertinent Hx: Per H&P on 5-17-19 of TELMA Sequeira CNP: The pt was brought to the ED per squad after having a syncopal episode at home. The pt was found on the floor by his roommate with \"seizure-like\" activity. Troponins elevated to 0.11;  radiology finding negative for acute findings    PMHx: per H&P has h/o of \"R MCA ischemic stroke with endovascular thrombectomy 8/2018\"  Response To Previous Treatment: Not applicable  Family / Caregiver Present: No  Referring Practitioner: TELMA Sequeira CNP  Referral Date : 05/17/19  Diagnosis: Syncope and Collapse; unclear etiology  Follows Commands: Within Functional Limits  Subjective  Subjective: The pt was found to be awake in the bed with nurse in the room; the pt initially was a little abrupt but was agreeable to therapy and became cooperative; the pt did report chronic buttocks pain from when he had the stroke; rated it a \"47/10\" when asked.   Pain Screening  Patient Currently in Pain: Denies          Orientation  Orientation  Overall Orientation Status: Within Functional Limits  Orientation Level: Oriented to person;Oriented to time;Oriented to situation;Oriented to place  Social/Functional History  Social/Functional History  Lives With: Friend(s)(roommate home all the time)  Type of Home: Trailer  Home Layout: One level  Home Access: Stairs to enter with rails  Entrance Stairs - Number of Steps: 4  Entrance Stairs - Rails: Both  Bathroom Shower/Tub: Tub/Shower unit, Shower chair with back  Bathroom Toilet: Standard(sink close by)  Bathroom Equipment: Shower chair, Hand-held shower  Home Equipment: Quad cane, [employer], Reacher(luis alberto-walker)  ADL Assistance: Independent  Homemaking Assistance: (roommate)  Ambulation Assistance: Independent(uses QC all the time)  Transfer Assistance: Independent  Active : Yes  Occupation: On disability  Additional Comments: the pt denies recent falls  Cognition   Cognition  Overall Cognitive Status: Exceptions  Arousal/Alertness: Appropriate responses to stimuli  Following Commands:  Follows one step commands consistently  Attention Span: Appears intact  Memory: Decreased recall of recent events  Safety Judgement: Decreased awareness of need for safety;Decreased awareness of need for assistance  Initiation: Requires cues for some  Cognition Comment: the pt is abrupt and definately set in his ways; resistant to education    Objective  AROM RLE (degrees)  RLE AROM: WFL  AROM LLE (degrees)  LLE General AROM: limited at ankle DF, knee extension and hip flexion but does have active movement in all joints  Strength RLE  Strength RLE: WFL  Strength LLE  Comment: limited at ankle DF, knee extension and hip flexion but does have active movement in all joints  Tone RLE  RLE Tone: Normotonic  Tone LLE  LLE Tone: Hypertonic  Sensation  Overall Sensation Status: (the pt denies decreased sensation)  Bed mobility  Supine to Sit: Stand by assistance(bed flat, no rail and increased time and effort)  Sit to Supine: Stand by assistance(bed flat and no rail)  Scooting: Stand by assistance(with cues to straighten

## 2019-05-18 NOTE — H&P
never used smokeless tobacco.  ETOH:   reports that he drinks alcohol. Family History:      Reviewed in detail positive as follows:    History reviewed. No pertinent family history. REVIEW OF SYSTEMS:   Pertinent positives as noted in the HPI. All other systems reviewed and negative. PHYSICAL EXAM PERFORMED:    BP (!) 136/91   Pulse 70   Temp 98.3 °F (36.8 °C) (Oral)   Resp 14   Ht 5' 6\" (1.676 m)   Wt 154 lb 8.7 oz (70.1 kg)   SpO2 96%   BMI 24.94 kg/m²     General appearance:  No apparent distress, disheveled appearance older than stated age. cooperative. HEENT:  Normal cephalic, atraumatic without obvious deformity. Pupils equal, round, and reactive to light. Extra ocular muscles intact. Conjunctivae/corneas clear. Neck: Supple, with full range of motion. No jugular venous distention. Trachea midline. Respiratory:  Normal respiratory effort. Wheezing bilaterally  Cardiovascular:  Regular rate and rhythm without murmurs, rubs or gallops. Abdomen: Soft, non-tender, non-distended, without rebound or guarding. Normal bowel sounds. Musculoskeletal:  No clubbing, cyanosis or edema bilaterally. Full range of motion without deformity. Left upper ext weak from previous stroke, able to move fingers but no gross movement of arm. Full strength to lower ext. Equally. No other deficit  Skin: Skin color, texture, turgor normal.  No rashes or lesions. Neurologic:  Neurovascularly intact without any focal sensory/motor deficits except as above.   Cranial nerves: II-XII intact, grossly non-focal.  Psychiatric:  Alert and oriented, thought content appropriate, normal insight  Capillary Refill: Brisk,< 3 seconds   Peripheral Pulses: +2 palpable, equal bilaterally       Labs:     Recent Labs     05/17/19 1822   WBC 6.8   HGB 16.0   HCT 47.7        Recent Labs     05/17/19 1822      K 3.5      CO2 16*   BUN 4*   CREATININE 1.1   CALCIUM 10.2     Recent Labs     05/17/19 1822   AST 15 ALT 15   BILITOT 0.3   ALKPHOS 99     Recent Labs     05/17/19  1822   INR 0.97     Recent Labs     05/17/19  1822 05/17/19  2350   TROPONINI <0.01 0.11*       Urinalysis:      Lab Results   Component Value Date    NITRU Negative 05/17/2019    BLOODU Negative 05/17/2019    SPECGRAV 1.008 05/17/2019    GLUCOSEU Negative 05/17/2019       Radiology:     CXR: I have reviewed the CXR with the following interpretation: No acute findings      XR KNEE LEFT (3 VIEWS)   Final Result   No acute findings         CT Head WO Contrast   Final Result   No acute intracranial abnormality. XR CHEST STANDARD (2 VW)   Final Result   No acute findings             ASSESSMENT:    Active Hospital Problems    Diagnosis Date Noted    Syncope and collapse [R55] 05/17/2019         PLAN:    Syncope and Collapse - the etiology is unclear. This could have been simple syncope or may have been seizure/ room mate thought he saw seizure activity. Unsure how reliable that account is. CT of the head was unremarkable for acute pathology. Pt will be admitted and monitored on Telemetry. We will trend serial cardiac enzymes, check Thyroid function Tests, Orthostatic Blood Pressure and Pulse, an Echocardiogram. Will also add seizure precautions. CVA - Hx Right MCA ischemic stroke with endovascular thrombectomy 8/2018, continue home medications, will perform neuro checks and consult neurology     Essential (primary) hypertension - continue home meds and monitor blood pressure    Hyperlipidemia - No current evidence of Rhabdomyolysis or other adverse effects. Continue statin therapy while in the hospital    DVT Prophylaxis: Lovenox  Diet: DIET CARDIAC;  Code Status: Full Code    PT/OT Eval Status: pending    [address] [facility], APRN - CNP    Thank you No primary care provider on file. for the opportunity to be involved in this patient's care.  If you have any questions or concerns please feel free to contact me at [phone].

## 2019-05-18 NOTE — CONSULTS
Nutrition Note:  The patient will still be monitored per nutrition standards of care. Consult dietitian if additional nutrition intervention is needed. Neither the total Chandan score nor the nutrition subscale score have been independently validated for use as a tool to predict risk of malnutrition. Many have concluded that the Chandan scale is highly overpredictive of actual pressure injury development. In other words, not all patients who are predicted to develop a pressure ulcer injury actually develop one.  This overprediction could be seen as a major flaw of the chandan scale, weakening its practical utility overall, especially as it relates to nutrition screening and referral to the RDN. ([place]. Academy of Nutrition and Dietetics, 2017)    Molly Nugent, [address] [place] [address]  Office:  [phone]

## 2019-05-18 NOTE — CONSULTS
Neurology Consult Note    Reason for Consult: passed out     Chief complaint: I passed out     Dr Alexandra Weston MD asked me to see Kathy Oconnor in consultation for evaluation of syncope. History of Present Illness:  Kathy Oconnor is a 66 y.o. male who presents with syncope  Location: generalized  Quality: loss of consciousness  Onset: abruptly following use of the bathroom  Course: resolved  Frequency: once  Duration: seconds to minutes  Modifying factors: none  Associated symptoms: maybe a jerk or two reported by roommate, however no significant descriptors further noted in admitting documentation    The patient denies any weakness, change in sensation, change in vision, or change in hearing. The patient denies any dysphagia. The patient denies confusion urinary or bowel incontinence, nausea or vomiting, and denies tongue biting. Medical History:  History reviewed. No pertinent past medical history. History reviewed. No pertinent surgical history. Medications Prior to Admission: aspirin 81 MG chewable tablet, Take 81 mg by mouth daily  clopidogrel (PLAVIX) 75 MG tablet, Take 1 tablet by mouth daily   SERTRALINE HCL PO, Take 1 tablet by mouth daily  Cholecalciferol (VITAMIN D PO), Take 1 tablet by mouth daily  METOPROLOL TARTRATE PO, Take 1 tablet by mouth 2 times daily  ROSUVASTATIN CALCIUM PO, Take 0.5 tablets by mouth  nitroGLYCERIN (NITROSTAT) 0.4 MG SL tablet, Place 0.4 mg under the tongue every 5 minutes as needed for Chest pain  No Known Allergies  History reviewed. No pertinent family history. Social History     Tobacco Use   Smoking Status Current Every Day Smoker    Packs/day: 1.00   Smokeless Tobacco Never Used     Social History     Substance and Sexual Activity   Drug Use Never     Social History     Substance and Sexual Activity   Alcohol Use Yes    Comment: occasionally           ROS:  Constitutional- No weight loss. No fevers. Eyes- No diplopia.    No photophobia. Ears/nose/throat- No dysphagia. No Dysarthria  Cardiovascular- No palpitations. No chest pain  Respiratory- No dyspnea. No Cough  Gastrointestinal- No Abdominal pain. No Vomiting. Genitourinary- No incontinence. No urinary retention  Musculoskeletal- No myalgia. No arthralgia  Skin- No rash. No easy bruising. Psychiatric- No depression. No anxiety  Endocrine- No diabetes. No thyroid issues. Hematologic- No bleeding difficulty. No fatigue  Neurologic- No weakness. No Headache. Exam:  Vitals:    05/18/19 0818   BP: 129/78   Pulse: 84   Resp: 16   Temp: 97.9 °F (36.6 °C)   SpO2: 93%          Constitutional   Vital signs: BP, HR, and RR reviewed   General Alert, no distress, well-nourished  Eyes: fundoscopic exam unable to visualize fundi  Cardiovascular: pulses symmetric in all 4 extremities. No peripheral edema. Psychiatric: cooperative with examination, no psychotic behavior noted. Neurologic  Mental status:  orientation to person and place  General fund of knowledge grossly intact  Memory grossly intact  Attention intact as able to attend well to the exam   Language fluent in conversation, no dysarthria  Comprehension intact; follows simple commands     Cranial nerves:  CN2: Visual Fields full w/o extinction on confrontational testing  CN 3,4,6: extraocular muscles intact  CN5: facial sensation symmetric  CN7:face symmetric  CN8: hearing grossly intact  CN9: palate elevated symmetrically  CN11: trap full strength on shoulder shrug  CN12: tongue midline with protrusion     Strength: No prontator drift. 5/5 strength in all 4 extremities     Deep tendon reflexes: 2/4 in all 4 extremities     Sensory: light touch and vibration is intact in all 4 extremities. No sensory extinction on double simultaneous stimulation     Cerebellar/coordination: finger nose finger normal without ataxia.     Tone: normal in all 4 extremities     Gait: gait was deferred for safety    I reviewed the following laboratory and imaging study reports, and I independently reviewed the following imaging studies.        Labs  Recent Results (from the past 36 hour(s))   CBC Auto Differential    Collection Time: 05/17/19  6:22 PM   Result Value Ref Range    WBC 6.8 4.0 - 11.0 K/uL    RBC 5.27 4.20 - 5.90 M/uL    Hemoglobin 16.0 13.5 - 17.5 g/dL    Hematocrit 47.7 40.5 - 52.5 %    MCV 90.4 80.0 - 100.0 fL    MCH 30.4 26.0 - 34.0 pg    MCHC 33.6 31.0 - 36.0 g/dL    RDW 14.0 12.4 - 15.4 %    Platelets 351 338 - 861 K/uL    MPV 8.6 5.0 - 10.5 fL    Neutrophils % 42.0 %    Lymphocytes % 47.6 %    Monocytes % 7.5 %    Eosinophils % 2.1 %    Basophils % 0.8 %    Neutrophils # 2.8 1.7 - 7.7 K/uL    Lymphocytes # 3.2 1.0 - 5.1 K/uL    Monocytes # 0.5 0.0 - 1.3 K/uL    Eosinophils # 0.1 0.0 - 0.6 K/uL    Basophils # 0.1 0.0 - 0.2 K/uL   Comprehensive Metabolic Panel w/ Reflex to MG    Collection Time: 05/17/19  6:22 PM   Result Value Ref Range    Sodium 142 136 - 145 mmol/L    Potassium reflex Magnesium 3.5 3.5 - 5.1 mmol/L    Chloride 104 99 - 110 mmol/L    CO2 16 (L) 21 - 32 mmol/L    Anion Gap 22 (H) 3 - 16    Glucose 122 (H) 70 - 99 mg/dL    BUN 4 (L) 7 - 20 mg/dL    CREATININE 1.1 0.9 - 1.3 mg/dL    GFR Non-African American >60 >60    GFR African American >60 >60    Calcium 10.2 8.3 - 10.6 mg/dL    Total Protein 7.2 6.4 - 8.2 g/dL    Alb 3.9 3.4 - 5.0 g/dL    Albumin/Globulin Ratio 1.2 1.1 - 2.2    Total Bilirubin 0.3 0.0 - 1.0 mg/dL    Alkaline Phosphatase 99 40 - 129 U/L    ALT 15 10 - 40 U/L    AST 15 15 - 37 U/L    Globulin 3.3 g/dL   Troponin    Collection Time: 05/17/19  6:22 PM   Result Value Ref Range    Troponin <0.01 <0.01 ng/mL   APTT    Collection Time: 05/17/19  6:22 PM   Result Value Ref Range    aPTT 33.1 26.0 - 36.0 sec   Protime-INR    Collection Time: 05/17/19  6:22 PM   Result Value Ref Range    Protime 11.1 9.8 - 13.0 sec    INR 0.97 0.86 - 1.14   Magnesium    Collection Time: 05/17/19  6:22 PM Result Value Ref Range    Magnesium 2.10 1.80 - 2.40 mg/dL   Magnesium    Collection Time: 05/17/19  6:22 PM   Result Value Ref Range    Magnesium 2.10 1.80 - 2.40 mg/dL   Urinalysis Reflex to Culture    Collection Time: 05/17/19  9:05 PM   Result Value Ref Range    Color, UA YELLOW Straw/Yellow    Clarity, UA Clear Clear    Glucose, Ur Negative Negative mg/dL    Bilirubin Urine Negative Negative    Ketones, Urine Negative Negative mg/dL    Specific Gravity, UA 1.008 1.005 - 1.030    Blood, Urine Negative Negative    pH, UA 6.0 5.0 - 8.0    Protein, UA Negative Negative mg/dL    Urobilinogen, Urine 0.2 <2.0 E.U./dL    Nitrite, Urine Negative Negative    Leukocyte Esterase, Urine Negative Negative    Microscopic Examination Not Indicated     Urine Reflex to Culture Not Indicated     Urine Type Not Specified    Lactic Acid, Plasma    Collection Time: 05/17/19  9:07 PM   Result Value Ref Range    Lactic Acid 1.7 0.4 - 2.0 mmol/L   Lipid, Fasting    Collection Time: 05/17/19 11:50 PM   Result Value Ref Range    Cholesterol, Fasting 87 0 - 199 mg/dL    Triglyceride, Fasting 147 0 - 150 mg/dL    HDL 33 (L) 40 - 60 mg/dL    LDL Calculated 25 <100 mg/dL    VLDL Cholesterol Calculated 29 Not Established mg/dL   Hemoglobin A1C    Collection Time: 05/17/19 11:50 PM   Result Value Ref Range    Hemoglobin A1C 6.2 See comment %    eAG 131.2 mg/dL   Troponin    Collection Time: 05/17/19 11:50 PM   Result Value Ref Range    Troponin 0.11 (H) <0.01 ng/mL   EKG 12 Lead    Collection Time: 05/18/19  2:46 AM   Result Value Ref Range    Ventricular Rate 71 BPM    Atrial Rate 71 BPM    P-R Interval 160 ms    QRS Duration 98 ms    Q-T Interval 400 ms    QTc Calculation (Bazett) 434 ms    P Axis 64 degrees    R Axis 74 degrees    T Axis -43 degrees    Diagnosis       Sinus rhythm with Premature supraventricular complexesAnterior infarct , age undeterminedAbnormal ECGWhen compared with ECG of 03-MAY-2010 13:52,Significant changes have occurred   Troponin    Collection Time: 05/18/19  4:18 AM   Result Value Ref Range    Troponin 0.10 (H) <0.01 ng/mL   TSH with Reflex    Collection Time: 05/18/19  4:18 AM   Result Value Ref Range    TSH 2.30 0.27 - 4.20 uIU/mL   Basic Metabolic Panel w/ Reflex to MG    Collection Time: 05/18/19  6:23 AM   Result Value Ref Range    Sodium 143 136 - 145 mmol/L    Potassium reflex Magnesium 3.8 3.5 - 5.1 mmol/L    Chloride 108 99 - 110 mmol/L    CO2 22 21 - 32 mmol/L    Anion Gap 13 3 - 16    Glucose 100 (H) 70 - 99 mg/dL    BUN 4 (L) 7 - 20 mg/dL    CREATININE 0.7 (L) 0.9 - 1.3 mg/dL    GFR Non-African American >60 >60    GFR African American >60 >60    Calcium 9.9 8.3 - 10.6 mg/dL   CBC    Collection Time: 05/18/19  6:23 AM   Result Value Ref Range    WBC 8.1 4.0 - 11.0 K/uL    RBC 5.07 4.20 - 5.90 M/uL    Hemoglobin 15.4 13.5 - 17.5 g/dL    Hematocrit 45.2 40.5 - 52.5 %    MCV 89.2 80.0 - 100.0 fL    MCH 30.5 26.0 - 34.0 pg    MCHC 34.2 31.0 - 36.0 g/dL    RDW 13.8 12.4 - 15.4 %    Platelets 185 363 - 774 K/uL    MPV 8.6 5.0 - 10.5 fL          Studies:  XR KNEE LEFT (3 VIEWS)   Final Result   No acute findings         CT Head WO Contrast   Final Result   No acute intracranial abnormality. XR CHEST STANDARD (2 VW)   Final Result   No acute findings           Impression and Recommendation:  1. Vasovagal syncope  2. History of ischemic stroke 8/2018 s/p IV thrombectomy  3. Left hemiparesis s/p ischemic stroke 8/2018      Jeane Rowland is a 66 y.o. male who presented with spell of loss of consciousness transiently, with relatively quick resolution, and return to baseline, which raises concern for cardiogenic syncope. The patient was using the restroom preceding the event raising question of vasovagal syncopal episode. Recommendations:  1.  Obtain MRI brain and MRA head and neck (or if not possible, a CT head) to evaluate for presence of possible foci of epileptiform discharges, for which seizure could

## 2019-05-18 NOTE — ED NOTES

## 2019-05-18 NOTE — PLAN OF CARE
Problem: Falls - Risk of:  Goal: Will remain free from falls  Description  Will remain free from falls  5/18/2019 1103 by Bere Graham RN  Outcome: Ongoing  5/18/2019 0415 by Adriana Walters RN  Outcome: Ongoing  Goal: Absence of physical injury  Description  Absence of physical injury  Outcome: Ongoing     Problem: Risk for Impaired Skin Integrity  Goal: Tissue integrity - skin and mucous membranes  Description  Structural intactness and normal physiological function of skin and  mucous membranes.   5/18/2019 1103 by Bere Graham RN  Outcome: Ongoing  5/18/2019 0415 by Adriana Walters RN  Outcome: Ongoing     Problem: Coping:  Goal: Ability to cope will improve  Description  Ability to cope will improve  Outcome: Ongoing  Goal: Ability to identify appropriate support needs will improve  Description  Ability to identify appropriate support needs will improve  Outcome: Ongoing     Problem: Safety:  Goal: Ability to remain free from injury will improve  Description  Ability to remain free from injury will improve  Outcome: Ongoing     Problem: Self-Concept:  Goal: Level of anxiety will decrease  Description  Level of anxiety will decrease  Outcome: Ongoing  Goal: Ability to verbalize feelings about condition will improve  Description  Ability to verbalize feelings about condition will improve  Outcome: Ongoing     Problem: Daily Care:  Goal: Daily care needs are met  Description  Daily care needs are met  5/18/2019 1103 by Bere Graham RN  Outcome: Ongoing  5/18/2019 0415 by Adriana Walters RN  Outcome: Ongoing     Problem: Discharge Planning:  Goal: Patients continuum of care needs are met  Description  Patients continuum of care needs are met  Outcome: Ongoing

## 2019-05-18 NOTE — PROGRESS NOTES
Hospitalist Progress Note    CC: <principal problem not specified>      Admit date: 5/17/2019  Days in hospital:  0    Subjective: Pt S/E. Pt reports he doesn't remember what happened last night. He is not very talkative today, not very forthcoming with his history. ROS:   Pertinent items are noted in HPI. Objective:    /78   Pulse 84   Temp 97.9 °F (36.6 °C) (Oral)   Resp 16   Ht 5' 6\" (1.676 m)   Wt 154 lb 8.7 oz (70.1 kg)   SpO2 93%   BMI 24.94 kg/m²     Gen: NAD, appears comfortable, unkempt  HEENT: NC/AT, moist mucous membranes, no oropharyngeal erythema or exudate  Neck: supple, trachea midline, no anterior cervical or SC LAD  Heart: Normal s1/s2, RRR, no murmurs, gallops, or rubs. Lungs: clear bilaterally, no wheezing, no rales, no rhonchi, no use of accessory muscles  Abd: bowel sounds present, soft, nontender, nondistended, no masses  Extrem: No clubbing, cyanosis, no edema  Skin: no rashes or lesions  Psych: A & Oriented, affect appropriate, questionable insight  Neuro: grossly intact, moves all four extremities spontaneously.   Cap refill: +2 sec    Medications:  Scheduled Meds:   aspirin  81 mg Oral Daily    vitamin D  1,000 Units Oral Daily    clopidogrel  75 mg Oral Daily    metoprolol tartrate  50 mg Oral BID    atorvastatin  20 mg Oral Nightly    sodium chloride flush  10 mL Intravenous 2 times per day    enoxaparin  40 mg Subcutaneous Daily       PRN Meds:  sodium chloride flush, magnesium hydroxide, ondansetron, acetaminophen    IV:        Intake/Output Summary (Last 24 hours) at 5/18/2019 0922  Last data filed at 5/18/2019 0430  Gross per 24 hour   Intake 1570 ml   Output 250 ml   Net 1320 ml       Results:  CBC:   Recent Labs     05/17/19 1822 05/18/19  0623   WBC 6.8 8.1   HGB 16.0 15.4   HCT 47.7 45.2   MCV 90.4 89.2    254     BMP:   Recent Labs     05/17/19 1822 05/18/19  0623    143   K 3.5 3.8    108   CO2 16* 22   BUN 4* 4*   CREATININE 1.1 0.7*     Mag: No results for input(s): MAG in the last 72 hours. Phos: No results found for: PHOS  No components found for: GLU    LIVER PROFILE:   Recent Labs     05/17/19 1822   AST 15   ALT 15   BILITOT 0.3   ALKPHOS 99     PT/INR:   Recent Labs     05/17/19 1822   PROTIME 11.1   INR 0.97     APTT:   Recent Labs     05/17/19 1822   APTT 33.1     UA:  Recent Labs     05/17/19  2105   COLORU YELLOW   PHUR 6.0   CLARITYU Clear   SPECGRAV 1.008   LEUKOCYTESUR Negative   UROBILINOGEN 0.2   BILIRUBINUR Negative   BLOODU Negative   GLUCOSEU Negative       Invalid input(s): ABG  Lab Results   Component Value Date    CALCIUM 9.9 05/18/2019       Assessment:    Active Problems:    Syncope and collapse  Resolved Problems:    * No resolved hospital problems. [facility] course: A 67 yo male admitted with syncope. His roommate reports he had some seizure like activity and the pt states was disoriented afterward.     Plan:  Syncope  - possible orthostatic vs seizure  - monitor on tele  -check orthostatic vitals  - EEG, ECHO  - consult neurology    CAD  Elevated troponins, check on more time  - he denies having chest pain  - ECG with not acute ischemia  - given his history of CAD/MI and syncope last night will consult cardiology  - ASA, statin, plavix    H/O CVA  - s/p MCA thrombectomy 8/2018  - ASA, statin, plavix  Code status:  full  DVT prophylaxis: [x] Lovenox  [] SQ Heparin  [] SCDs because of  [] warfarin/oral direct thrombin inhibitor [] Encourage ambulation      Disposition:  [] Home [] Rehab [] Psych [] SNF  [] LTAC  [] Transfer to ICU  [] Transfer to PCU [] Other: obs    Electronically signed by Harsh Hickman DO on 5/18/2019 at 9:22 AM

## 2019-05-18 NOTE — ED NOTES
ED SBAR report provider to [facility], [place], [address] [facility]. Patient to be transported to Room 5260 via stretcher by ED tech. Patient transported with bedside cardiac monitor. IV site clean, dry, and intact. MEWS score and pain assessed and documented. Updated patient on plan of care.        Daylin Bowers RN  05/17/19 6115

## 2019-05-19 VITALS
WEIGHT: 153 LBS | HEIGHT: 66 IN | HEART RATE: 72 BPM | DIASTOLIC BLOOD PRESSURE: 73 MMHG | RESPIRATION RATE: 16 BRPM | TEMPERATURE: 98.7 F | SYSTOLIC BLOOD PRESSURE: 123 MMHG | OXYGEN SATURATION: 94 % | BODY MASS INDEX: 24.59 KG/M2

## 2019-05-19 LAB
EKG ATRIAL RATE: 82 BPM
EKG DIAGNOSIS: NORMAL
EKG P AXIS: 74 DEGREES
EKG P-R INTERVAL: 150 MS
EKG Q-T INTERVAL: 402 MS
EKG QRS DURATION: 108 MS
EKG QTC CALCULATION (BAZETT): 469 MS
EKG R AXIS: 75 DEGREES
EKG T AXIS: -22 DEGREES
EKG VENTRICULAR RATE: 82 BPM

## 2019-05-19 PROCEDURE — 6360000002 HC RX W HCPCS: Performed by: NURSE PRACTITIONER

## 2019-05-19 PROCEDURE — 96372 THER/PROPH/DIAG INJ SC/IM: CPT

## 2019-05-19 PROCEDURE — 6370000000 HC RX 637 (ALT 250 FOR IP): Performed by: NURSE PRACTITIONER

## 2019-05-19 PROCEDURE — G0378 HOSPITAL OBSERVATION PER HR: HCPCS

## 2019-05-19 PROCEDURE — 93010 ELECTROCARDIOGRAM REPORT: CPT | Performed by: INTERNAL MEDICINE

## 2019-05-19 RX ADMIN — ASPIRIN 81 MG 81 MG: 81 TABLET ORAL at 08:58

## 2019-05-19 RX ADMIN — VITAMIN D, TAB 1000IU (100/BT) 1000 UNITS: 25 TAB at 08:58

## 2019-05-19 RX ADMIN — METOPROLOL TARTRATE 50 MG: 50 TABLET ORAL at 08:58

## 2019-05-19 RX ADMIN — ENOXAPARIN SODIUM 40 MG: 40 INJECTION SUBCUTANEOUS at 08:58

## 2019-05-19 RX ADMIN — CLOPIDOGREL BISULFATE 75 MG: 75 TABLET ORAL at 08:58

## 2019-05-19 ASSESSMENT — PAIN SCALES - GENERAL
PAINLEVEL_OUTOF10: 0
PAINLEVEL_OUTOF10: 0

## 2019-05-19 NOTE — PROGRESS NOTES
Reviewed med list, follow up and scheduling and dc instructions. Verbalized understanding. Follow up information given. Patient dc via wc to home.

## 2019-05-19 NOTE — DISCHARGE SUMMARY
Hospitalist Discharge Summary    Patient ID:  Jodie Choi  [id number]  66 y.o.  1960    Admit date: 5/17/2019    Discharge date: 5/19/2019    Disposition: home    Admission Diagnoses:   Patient Active Problem List   Diagnosis    Syncope and collapse    Demand ischemia ([facility] [place] [address]    Coronary artery disease involving native coronary artery of native heart without angina pectoris    Nicotine dependence    History of CVA (cerebrovascular accident)       Discharge Diagnoses: Principal Problem:    Syncope and collapse  Active Problems:    Demand ischemia ([facility] [place] [address]    Coronary artery disease involving native coronary artery of native heart without angina pectoris    Nicotine dependence    History of CVA (cerebrovascular accident)  Resolved Problems:    * No resolved hospital problems. *      Code Status:  Full Code    Condition:  Stable    Discharge Diet: Diet:  DIET CARDIAC; Diet NPO, After Midnight    PCP to do list:  Needs stress  myoview - may have underlying disease    Hospital Course: 66yo WM With known CAD presents with syncope. Roommate states that he had some seizure like activity and was disoriented afterward - no further events since admission. He has ongoing tobacco abuse and COPD. He insists on going home. He is on ASA and plavix already, but has some demand ischemia.   He will need a stress test.    Discharge Medications:   Current Discharge Medication List        Current Discharge Medication List        Current Discharge Medication List      CONTINUE these medications which have NOT CHANGED    Details   aspirin 81 MG chewable tablet Take 81 mg by mouth daily      clopidogrel (PLAVIX) 75 MG tablet Take 1 tablet by mouth daily       SERTRALINE HCL PO Take 1 tablet by mouth daily      Cholecalciferol (VITAMIN D PO) Take 1 tablet by mouth daily      METOPROLOL TARTRATE PO Take 1 tablet by mouth 2 times daily      ROSUVASTATIN CALCIUM PO Take 0.5 tablets by mouth nitroGLYCERIN (NITROSTAT) 0.4 MG SL tablet Place 0.4 mg under the tongue every 5 minutes as needed for Chest pain           Current Discharge Medication List              Procedures: none    Assessment on Discharge: Stable, improved     Discharge ROS:  A complete review of systems was asked and negative except for denies chest pain or SOB    Discharge Exam:  /73   Pulse 72   Temp 98.7 °F (37.1 °C) (Oral)   Resp 16   Ht 5' 6\" (1.676 m)   Wt 153 lb (69.4 kg)   SpO2 94%   BMI 24.69 kg/m²     Gen: NAD  HEENT: NC/AT, moist mucous membranes, no oropharyngeal erythema or exudate  Neck: supple, trachea midline, no anterior cervical or SC LAD  Heart:  Normal s1/s2, RRR, no murmurs, gallops, or rubs. no leg edema  Lungs:  Chronic mild wheeze bilaterally, chronic mild wheeze, no rales, no rhonchi, no crackles, no use of accessory muscles  Abd: bowel sounds present, soft, nontender, nondistended, no masses  Extrem:  No clubbing, cyanosis,  no edema  Skin: no rashes or lesions  Psych: A & O x3  Neuro: grossly intact, moves all four extremities. Pertinent Studies During Hospital Stay:  Radiology:  Xr Chest Standard (2 Vw)    Result Date: 5/17/2019  EXAMINATION: TWO XRAY VIEWS OF THE CHEST 5/17/2019 6:50 pm COMPARISON: May 3, 2010 HISTORY: ORDERING SYSTEM PROVIDED HISTORY: Syncope, shortness of breath, hypoxic TECHNOLOGIST PROVIDED HISTORY: Reason for exam:->Syncope, shortness of breath, hypoxic Ordering Physician Provided Reason for Exam: Syncope, shortness of breath, hypoxic Acuity: Acute Type of Exam: Initial FINDINGS: Cardiac silhouette is normal in size. Lungs appear clear. No acute bony abnormality. No acute findings     Xr Knee Left (3 Views)    Result Date: 5/17/2019  EXAMINATION: 3 XRAY VIEWS OF THE LEFT KNEE 5/17/2019 8:01 pm COMPARISON: None.  HISTORY: ORDERING SYSTEM PROVIDED HISTORY: Fall with injury TECHNOLOGIST PROVIDED HISTORY: Reason for exam:->Fall with injury Ordering Physician Provided Reason for Exam: Fall with injury Acuity: Acute Type of Exam: Initial Mechanism of Injury: fall FINDINGS: No acute fracture or dislocation. Vascular calcifications. No joint effusion. No significant joint space narrowing. No acute findings     Ct Head Wo Contrast    Result Date: 5/17/2019  EXAMINATION: CT OF THE HEAD WITHOUT CONTRAST  5/17/2019 6:56 pm TECHNIQUE: CT of the head was performed without the administration of intravenous contrast. Dose modulation, iterative reconstruction, and/or weight based adjustment of the mA/kV was utilized to reduce the radiation dose to as low as reasonably achievable. COMPARISON: None available HISTORY: ORDERING SYSTEM PROVIDED HISTORY: Syncope TECHNOLOGIST PROVIDED HISTORY: If patient is on cardiac monitor and/or pulse ox, they may be taken off cardiac monitor and pulse ox, left on O2 if currently on. All monitors reattached when patient returns to room. Has a \"code stroke\" or \"stroke alert\" been called? ->No Ordering Physician Provided Reason for Exam: syncope History of smoking FINDINGS: BRAIN/VENTRICLES: There is no acute intracranial hemorrhage, mass effect or midline shift. No abnormal extra-axial fluid collection. The gray-white differentiation is maintained without evidence of an acute infarct. There is no evidence of hydrocephalus. There is encephalomalacia within the right frontal and temporal lobes compatible with remote insult, likely infarct. ORBITS: The visualized portion of the orbits demonstrate no acute abnormality. SINUSES: The visualized paranasal sinuses and mastoid air cells demonstrate no acute abnormality. SOFT TISSUES/SKULL:  No acute abnormality of the visualized skull or soft tissues. No acute intracranial abnormality. Last Labs on Discharge:     No results found for this or any previous visit (from the past 24 hour(s)). Follow up: with No primary care provider on file.     Note that over 30 minutes was spent in preparing discharge papers, discussing discharge with patient, medication review, etc.    Thank you No primary care provider on file. for the opportunity to be involved in this patient's care. If you have any questions or concerns please feel free to contact me at [phone].     Electronically signed by Caesar Schultz MD on 5/19/2019 at 2:18 PM

## 2019-05-19 NOTE — PROGRESS NOTES
status:  FULL    DVT prophylaxis: [x] Lovenox  [] SQ Heparin  [] SCDs because of  [] warfarin/oral direct thrombin inhibitor [] Encourage ambulation  GI prophylaxis: [] PPI/[id number]  [x] not indicated  Diet:  DIET CARDIAC; Diet NPO, After Midnight    Disposition:  [x] Home  [] Home with home health [] Rehab [] Psych [] SNF  [] LTAC  [] Long term nursing home or group home [] Transfer to ICU  [] Transfer to PCU [] Other:     Medications:  Scheduled Meds:   atorvastatin  40 mg Oral Nightly    aspirin  81 mg Oral Daily    vitamin D  1,000 Units Oral Daily    clopidogrel  75 mg Oral Daily    metoprolol tartrate  50 mg Oral BID    sodium chloride flush  10 mL Intravenous 2 times per day    enoxaparin  40 mg Subcutaneous Daily       PRN Meds:  regadenoson, sodium chloride flush, magnesium hydroxide, ondansetron, acetaminophen    IV:        Intake/Output Summary (Last 24 hours) at 5/19/2019 1414  Last data filed at 5/19/2019 0900  Gross per 24 hour   Intake 480 ml   Output 300 ml   Net 180 ml       Results:  CBC:   Recent Labs     05/17/19 1822 05/18/19  0623   WBC 6.8 8.1   HGB 16.0 15.4   HCT 47.7 45.2   MCV 90.4 89.2    254     BMP:   Recent Labs     05/17/19 1822 05/18/19  0623    143   K 3.5 3.8    108   CO2 16* 22   BUN 4* 4*   CREATININE 1.1 0.7*     Mag: No results for input(s): MAG in the last 72 hours.   Phos: No results found for: PHOS  No components found for: GLU    LIVER PROFILE:   Recent Labs     05/17/19 1822   AST 15   ALT 15   BILITOT 0.3   ALKPHOS 99     PT/INR:   Recent Labs     05/17/19 1822   PROTIME 11.1   INR 0.97     APTT:   Recent Labs     05/17/19 1822   APTT 33.1     UA:  Recent Labs     05/17/19 2105   COLORU YELLOW   PHUR 6.0   CLARITYU Clear   SPECGRAV 1.008   LEUKOCYTESUR Negative   UROBILINOGEN 0.2   BILIRUBINUR Negative   BLOODU Negative   GLUCOSEU Negative       Invalid input(s): ABG  Lab Results   Component Value Date    CALCIUM 9.9 05/18/2019 Electronically signed by Ruma Knox MD on 5/19/2019 at 2:14 PM

## 2019-05-19 NOTE — CARE COORDINATION
Discharge order noted   SW met with patient and introduced self and role of discharge planning    No PCP or insurance noted in Epic   However patient states he has medicaid and his PCP is through the [facility] [address]   Patient also uses the [address]   Patient does not have any home health or community services    Patient lives in a trailer with a roommate in [place]   Patient is independent in all ADLs and uses a cane to assist with ambulation    Patient denies any resources at this time  Friend will be transporting home     SW available if discharge needs arise    Electronically signed by MANDEEP Zhou on 5/19/2019 at 3:15 PM  [phone]

## 2019-05-19 NOTE — PLAN OF CARE
Problem: Falls - Risk of:  Goal: Will remain free from falls  Description  Will remain free from falls  5/19/2019 0005 by Karla Clark RN  Outcome: Ongoing   Fall risk precautions in place. Bed in lowest position with wheels locked,bed alarm in place and activated,non-skid socks on pt, fall risk ID on pt, call light in reach, will continue to monitor. Problem: Risk for Impaired Skin Integrity  Goal: Tissue integrity - skin and mucous membranes  Description  Structural intactness and normal physiological function of skin and  mucous membranes. 5/19/2019 0005 by Karla Clark RN  Outcome: Ongoing   Pt encouraged to turn/rotate every 2 hours. Problem: Safety:  Goal: Ability to remain free from injury will improve  Description  Ability to remain free from injury will improve  5/19/2019 0005 by Karla Clark RN  Outcome: Ongoing   All side rails padded. Neuro checks q4h. Pt educated on seizure precautions  and safety precautions.

## 2019-05-19 NOTE — PROGRESS NOTES
UTILIZATION REVIEW     Danna BERRY  5/19/2019,   No primary care provider on file. 1960  Chief Complaint   Patient presents with    Fall     Patient was found by roomate after hear a loud noise. Patient was found unresponsive by roomate with what they described as having \"seizure like activity\". Patient currently alert and oriented.  Knee Pain     New onset of left knee pain from fall         Principal Problem:    Syncope and collapse  Active Problems:    Demand ischemia (HCC)    Coronary artery disease involving native coronary artery of native heart without angina pectoris    Nicotine dependence    History of CVA (cerebrovascular accident)  Resolved Problems:    * No resolved hospital problems. *     has a past medical history of CAD (coronary artery disease), CVA (cerebral vascular accident) ([facility] [address] and Nicotine addiction. Past Surgical History:     has a past surgical history that includes Cardiac catheterization (2010). ED REVIEW:    Chief Complaint   Patient presents with    Fall       Patient was found by roomate after hear a loud noise. Patient was found unresponsive by roomate with what they described as having \"seizure like activity\". Patient currently alert and oriented.  Knee Pain       New onset of left knee pain from fall          HISTORY OF PRESENT ILLNESS   (Location/Symptom, Timing/Onset, Context/Setting, Quality, Duration, Modifying Factors, Severity)  Note limiting factors.      Royce More is a 66 y.o. male patient presenting by EMS. It is reported the patient had syncopal event of unknown reason. Patient was sent hallway. Remainder heard a thud. He went to see his roommate. There is no seizure activity reported. EMS was contacted and the patient has been brought out for evaluation.   It is estimated EMS was contacted and arrived at approximately 5:30 PM.  Patient arrived in ER at 6:05 PM.  I evaluated patient at 6:30 PM.  At this time he complains of no pain, urinating. He does not recall a prodrome or walking out of the bathroom but \"passed out. \" Reportedly his roommate heard the patient hit the ground and had seizure like activity. The patient does not recall any details until EMS arrived. At that point, he said he felt \"normal.\" He denies a history of seizures or syncope. He had an MI in 2010 with PCI to Lcx. He denies any recent chest pains. He has chronic MARTINEZ and continues to smoke but denies any acute changes. Patient denies dyspnea at rest, PND, orthopnea, palpitations, lightheadedness, weight changes, and LE edema.     CARDIOLOGY:   Assessment and Plan   1) Syncope and collapse. Etiology uncertain. Patient does not recall details but occurred after micturition so vasovagal is possible. The roommate reports seizure-like activity and the patient has a history of CVA so seizure seems possible. Neurology consulted. No arrhythmia thus far on telemetry. Will repeat echocardiogram and likely arrange for an outpatient event monitor.    2) Demand ischemia. ACS seems unlikely as he denies chest pains. An arrhythmia or seizure could elevate troponin. Will perform stress test on Monday.    3) CAD. Asymptomatic. Continue with medical management and risk factor modification including aspirin, statin, and B-blocker.    4) Nicotine Addiction. Encouraged smoking cessation but patient is in the contemplative stage.    5) History of CVA. Increase statin to high intensity dosing.      Thank you for allowing us to participate in the care of Sierra Barnes, [address]  5/18/2019 3:51 PM      WORKUP in process  Complex-  DR MONTAGUE: Plan:  Syncope  - possible orthostatic vs seizure  - monitor on tele  -check orthostatic vitals  - EEG, ECHO  - consult neurology     CAD  Elevated troponins, check on more time  - he denies having chest pain  - ECG with not acute ischemia  - given his history of CAD/MI and syncope last night will consult cardiology  - ASA, statin, plavix     H/O CVA  - s/p MCA thrombectomy 8/2018  - ASA, statin, plavix  Code status:  full  DVT prophylaxis: [x] Lovenox  [] SQ Heparin  [] SCDs because of  [] warfarin/oral direct thrombin inhibitor [] Encourage ambulation        Disposition:  [] Home [] Rehab [] Psych [] SNF  [] LTAC  [] Transfer to ICU  [] Transfer to PCU [] Other: obs     Electronically signed by Ginny Lang DO on 5/18/2019 at 9:22 AM          MY REVIEW:  66year old male  Syncope  Seizure? Diastolic hypertension  CAD and PCI 2010   STROKE historry  Hypertension   On Rx  ATRIAL SEPTAL DEFECT or PFO- Congenital  LVH by EC HO  Hyperlipidemia on Rx  PVD  Smoker  IV FLUIDS  The Utilization Review Committee members, including its physician members, have reviewed this case and agree that this patient does  NOT YET  meet evidence based criteria for inpatient services,  Medical care will continue to be provided by Marcial Berman MD, and Cardiology and Neurology who concurs with this decision and has documented his/her concurrence in the patient's medical record. Arvin Azar MD, RAIM[place]DO, [place] [address], [address] [place] [address]  Cardiac, Vascular & Thoracic Surgeon  [address] [place] [id number]    Office [phone]   [phone]  [email or web address]    5/19/2019  2:15 PM

## 2019-05-21 NOTE — ED PROVIDER NOTES
Attending Supervisory Note/Shared Visit   I have personally performed a face to face diagnostic evaluation on this patient. I have reviewed the mid-levels findings and agree. History and Exam by me shows a chronically appearing male in no acute distress. .  Patient has a history of previous CVA. EMS was called this evening when the patient was found unresponsive by a roommate. The room and reported convulsions and seizure-like activity. Patient denies a history of seizures or syncopal episodes. Patient states that he was feeling well prior to the events and denies recent illness. On exam counter 2-12 are grossly intact in the patient's intact  sensation in all extremities. Patient has residual weakness in the left upper and lower extremities which he states is at baseline. Patient's workup remarkable for a metabolic acidosis with an anion gap. Lactate within normal limits. Patient denies ingesting any foreign substances. CT the head obtained did not show any acute intra-cranial pathology. Will be admitted to the hospital for further medical management and evaluation for syncope versus seizure. I agree with the FRANCO plan of care. Please FRANCO Note for full ED course and final disposition. Disposition:  1. New onset seizure ([address]    2. Syncope and collapse    3.  History of CVA with residual deficit          Ajay Kuo MD  Attending Emergency Physician        Ajay Kuo MD  05/21/19 Skyler Clark MD  05/21/19 [id number]

## 2020-01-07 ENCOUNTER — APPOINTMENT (OUTPATIENT)
Dept: CT IMAGING | Age: 60
End: 2020-01-07
Payer: OTHER GOVERNMENT

## 2020-01-07 ENCOUNTER — APPOINTMENT (OUTPATIENT)
Dept: GENERAL RADIOLOGY | Age: 60
End: 2020-01-07
Payer: OTHER GOVERNMENT

## 2020-01-07 ENCOUNTER — HOSPITAL ENCOUNTER (EMERGENCY)
Age: 60
Discharge: ANOTHER HOSPITAL | End: 2020-01-08
Attending: EMERGENCY MEDICINE
Payer: OTHER GOVERNMENT

## 2020-01-07 VITALS
BODY MASS INDEX: 21.1 KG/M2 | DIASTOLIC BLOOD PRESSURE: 78 MMHG | OXYGEN SATURATION: 95 % | SYSTOLIC BLOOD PRESSURE: 139 MMHG | RESPIRATION RATE: 21 BRPM | TEMPERATURE: 98.4 F | HEART RATE: 99 BPM | WEIGHT: 130.73 LBS

## 2020-01-07 LAB
A/G RATIO: 1.4 (ref 1.1–2.2)
ALBUMIN SERPL-MCNC: 4.2 G/DL (ref 3.4–5)
ALP BLD-CCNC: 90 U/L (ref 40–129)
ALT SERPL-CCNC: 12 U/L (ref 10–40)
AMPHETAMINE SCREEN, URINE: ABNORMAL
ANION GAP SERPL CALCULATED.3IONS-SCNC: 15 MMOL/L (ref 3–16)
AST SERPL-CCNC: 14 U/L (ref 15–37)
BARBITURATE SCREEN URINE: ABNORMAL
BASE EXCESS ARTERIAL: -1.3 MMOL/L (ref -3–3)
BASOPHILS ABSOLUTE: 0.1 K/UL (ref 0–0.2)
BASOPHILS RELATIVE PERCENT: 0.9 %
BENZODIAZEPINE SCREEN, URINE: ABNORMAL
BILIRUB SERPL-MCNC: 0.3 MG/DL (ref 0–1)
BILIRUBIN URINE: NEGATIVE
BLOOD, URINE: NEGATIVE
BUN BLDV-MCNC: 5 MG/DL (ref 7–20)
CALCIUM SERPL-MCNC: 10.2 MG/DL (ref 8.3–10.6)
CANNABINOID SCREEN URINE: POSITIVE
CARBOXYHEMOGLOBIN ARTERIAL: 1.8 % (ref 0–1.5)
CHLORIDE BLD-SCNC: 105 MMOL/L (ref 99–110)
CLARITY: CLEAR
CO2: 22 MMOL/L (ref 21–32)
COCAINE METABOLITE SCREEN URINE: ABNORMAL
COLOR: YELLOW
CREAT SERPL-MCNC: 0.7 MG/DL (ref 0.9–1.3)
EOSINOPHILS ABSOLUTE: 0.1 K/UL (ref 0–0.6)
EOSINOPHILS RELATIVE PERCENT: 1.5 %
GFR AFRICAN AMERICAN: >60
GFR NON-AFRICAN AMERICAN: >60
GLOBULIN: 3 G/DL
GLUCOSE BLD-MCNC: 101 MG/DL (ref 70–99)
GLUCOSE URINE: NEGATIVE MG/DL
HCO3 ARTERIAL: 22.9 MMOL/L (ref 21–29)
HCT VFR BLD CALC: 47.5 % (ref 40.5–52.5)
HEMOGLOBIN, ART, EXTENDED: 14.6 G/DL (ref 13.5–17.5)
HEMOGLOBIN: 15.8 G/DL (ref 13.5–17.5)
KETONES, URINE: NEGATIVE MG/DL
LACTIC ACID: 3.3 MMOL/L (ref 0.4–2)
LEUKOCYTE ESTERASE, URINE: NEGATIVE
LYMPHOCYTES ABSOLUTE: 1.8 K/UL (ref 1–5.1)
LYMPHOCYTES RELATIVE PERCENT: 23.1 %
Lab: ABNORMAL
MCH RBC QN AUTO: 28.8 PG (ref 26–34)
MCHC RBC AUTO-ENTMCNC: 33.3 G/DL (ref 31–36)
MCV RBC AUTO: 86.5 FL (ref 80–100)
METHADONE SCREEN, URINE: ABNORMAL
METHEMOGLOBIN ARTERIAL: 0.5 %
MICROSCOPIC EXAMINATION: NORMAL
MONOCYTES ABSOLUTE: 0.5 K/UL (ref 0–1.3)
MONOCYTES RELATIVE PERCENT: 6.6 %
NEUTROPHILS ABSOLUTE: 5.2 K/UL (ref 1.7–7.7)
NEUTROPHILS RELATIVE PERCENT: 67.9 %
NITRITE, URINE: NEGATIVE
O2 CONTENT ARTERIAL: 20 ML/DL
O2 SAT, ARTERIAL: 98.7 %
O2 THERAPY: ABNORMAL
OPIATE SCREEN URINE: ABNORMAL
OXYCODONE URINE: ABNORMAL
PCO2 ARTERIAL: 39.1 MMHG (ref 35–45)
PDW BLD-RTO: 14.9 % (ref 12.4–15.4)
PH ARTERIAL: 7.39 (ref 7.35–7.45)
PH UA: 7
PH UA: 7 (ref 5–8)
PHENCYCLIDINE SCREEN URINE: ABNORMAL
PLATELET # BLD: 354 K/UL (ref 135–450)
PMV BLD AUTO: 8.1 FL (ref 5–10.5)
PO2 ARTERIAL: 110 MMHG (ref 75–108)
POTASSIUM REFLEX MAGNESIUM: 4.1 MMOL/L (ref 3.5–5.1)
PROPOXYPHENE SCREEN: ABNORMAL
PROTEIN UA: NEGATIVE MG/DL
RBC # BLD: 5.49 M/UL (ref 4.2–5.9)
SODIUM BLD-SCNC: 142 MMOL/L (ref 136–145)
SPECIFIC GRAVITY UA: 1.01 (ref 1–1.03)
TCO2 ARTERIAL: 24.1 MMOL/L
TOTAL PROTEIN: 7.2 G/DL (ref 6.4–8.2)
TROPONIN: <0.01 NG/ML
TSH SERPL DL<=0.05 MIU/L-ACNC: 2.53 UIU/ML (ref 0.27–4.2)
URINE REFLEX TO CULTURE: NORMAL
URINE TYPE: NORMAL
UROBILINOGEN, URINE: 0.2 E.U./DL
WBC # BLD: 7.7 K/UL (ref 4–11)

## 2020-01-07 PROCEDURE — 94761 N-INVAS EAR/PLS OXIMETRY MLT: CPT

## 2020-01-07 PROCEDURE — 2580000003 HC RX 258: Performed by: EMERGENCY MEDICINE

## 2020-01-07 PROCEDURE — 84484 ASSAY OF TROPONIN QUANT: CPT

## 2020-01-07 PROCEDURE — 70450 CT HEAD/BRAIN W/O DYE: CPT

## 2020-01-07 PROCEDURE — 93005 ELECTROCARDIOGRAM TRACING: CPT | Performed by: EMERGENCY MEDICINE

## 2020-01-07 PROCEDURE — 80307 DRUG TEST PRSMV CHEM ANLYZR: CPT

## 2020-01-07 PROCEDURE — 81003 URINALYSIS AUTO W/O SCOPE: CPT

## 2020-01-07 PROCEDURE — 71045 X-RAY EXAM CHEST 1 VIEW: CPT

## 2020-01-07 PROCEDURE — 99285 EMERGENCY DEPT VISIT HI MDM: CPT

## 2020-01-07 PROCEDURE — 82803 BLOOD GASES ANY COMBINATION: CPT

## 2020-01-07 PROCEDURE — 80053 COMPREHEN METABOLIC PANEL: CPT

## 2020-01-07 PROCEDURE — 96365 THER/PROPH/DIAG IV INF INIT: CPT

## 2020-01-07 PROCEDURE — 85025 COMPLETE CBC W/AUTO DIFF WBC: CPT

## 2020-01-07 PROCEDURE — 96375 TX/PRO/DX INJ NEW DRUG ADDON: CPT

## 2020-01-07 PROCEDURE — 36415 COLL VENOUS BLD VENIPUNCTURE: CPT

## 2020-01-07 PROCEDURE — 2700000000 HC OXYGEN THERAPY PER DAY

## 2020-01-07 PROCEDURE — 84443 ASSAY THYROID STIM HORMONE: CPT

## 2020-01-07 PROCEDURE — 83605 ASSAY OF LACTIC ACID: CPT

## 2020-01-07 PROCEDURE — 6360000002 HC RX W HCPCS: Performed by: EMERGENCY MEDICINE

## 2020-01-07 PROCEDURE — 36600 WITHDRAWAL OF ARTERIAL BLOOD: CPT

## 2020-01-07 RX ORDER — LEVETIRACETAM 10 MG/ML
1000 INJECTION INTRAVASCULAR ONCE
Status: COMPLETED | OUTPATIENT
Start: 2020-01-07 | End: 2020-01-07

## 2020-01-07 RX ORDER — LORAZEPAM 2 MG/ML
1 INJECTION INTRAMUSCULAR ONCE
Status: COMPLETED | OUTPATIENT
Start: 2020-01-07 | End: 2020-01-07

## 2020-01-07 RX ORDER — 0.9 % SODIUM CHLORIDE 0.9 %
1000 INTRAVENOUS SOLUTION INTRAVENOUS ONCE
Status: COMPLETED | OUTPATIENT
Start: 2020-01-07 | End: 2020-01-07

## 2020-01-07 RX ADMIN — SODIUM CHLORIDE 1000 ML: 9 INJECTION, SOLUTION INTRAVENOUS at 15:41

## 2020-01-07 RX ADMIN — LORAZEPAM 1 MG: 2 INJECTION INTRAMUSCULAR; INTRAVENOUS at 19:01

## 2020-01-07 RX ADMIN — LEVETIRACETAM 1000 MG: 10 INJECTION INTRAVENOUS at 19:01

## 2020-01-07 NOTE — ED PROVIDER NOTES
[address] [facility]      Pt Name: Danilo Burgos  MRN: [id number]  [place] 1960  Date of evaluation: 1/7/2020  Provider: Jimbo Conte DO    CHIEF COMPLAINT  Chief Complaint   Patient presents with    Seizures       HPI  Danilo Burgos is a 64 y.o. male who presents with seizure at home just prior to arrival.  Patient states his roommate, Blake Oh, called 911. He has had one seizure approximately 1 year ago but has not had any since. He currently is not on any seizure medicine are supposed to be on seizure medicines. He is a heavy smoker. He denies use of illegal drugs or alcohol. He denies use of amphetamines. He denies having oxygen at home. He states he was confused afterwards. He denies any pain at this time. He states he did not bite his tongue. He did not fall or hit his head. He states he was sitting watching TV at the time. ? REVIEW OF SYSTEMS  All systems negative except as noted in the HPI. Reviewed Nurses' notes and concur. History limited due to seizure and postictal state. No LMP for male patient. PAST MEDICAL HISTORY  Past Medical History:   Diagnosis Date    CAD (coronary artery disease)     [facility]cere[facility] vascular [facility]) [facility] [address]     Nicotine addiction        FAMILY HISTORY  History reviewed. No pertinent family history. SOCIAL HISTORY   reports that he has been smoking. He has been smoking about 1.00 pack per day. He has never used smokeless tobacco. He reports current alcohol use. He reports that he does not use drugs.     SURGICAL HISTORY  Past Surgical History:   Procedure Laterality Date    CARDIAC CATHETERIZATION  2010       CURRENT MEDICATIONS  Current Outpatient Rx   Medication Sig Dispense Refill    aspirin 81 MG chewable tablet Take 81 mg by mouth daily      clopidogrel (PLAVIX) 75 MG tablet Take 1 tablet by mouth daily       SERTRALINE HCL PO Take 1 tablet by mouth daily      tremors.   Psychiatric: Affect normal, Mood normal.    ?  LABORATORY  Labs Reviewed   URINE DRUG SCREEN - Abnormal; Notable for the following components:       Result Value    Cannabinoid Scrn, Ur POSITIVE (*)     All other components within normal limits    Narrative:     Performed at:  [place] [address] [address] [facility] [employer] [address]   Phone [phone]   COMPREHENSIVE METABOLIC PANEL W/ REFLEX TO MG FOR LOW K - Abnormal; Notable for the following components:    Glucose 101 (*)     BUN 5 (*)     CREATININE 0.7 (*)     AST 14 (*)     All other components within normal limits    Narrative:     Performed at:  [place] [address] [address] [facility] [employer] [address]   Phone [phone]   LACTIC ACID, PLASMA - Abnormal; Notable for the following components:    Lactic Acid 3.3 (*)     All other components within normal limits    Narrative:     Performed at:  [address] [facility] [employer] [address]   Phone [phone]   BLOOD GAS, ARTERIAL - Abnormal; Notable for the following components:    pO2, Arterial 110.0 (*)     Carboxyhgb, Arterial 1.8 (*)     All other components within normal limits    Narrative:     Performed at:  [place] [address] [address], [facility] [employer]   Phone [phone]   CBC WITH AUTO DIFFERENTIAL    Narrative:     Performed at:  [place] [address] [address], [facility] [employer] [address]   Phone [phone]   TROPONIN    Narrative:     Performed at:  [facility]  [address], [facility] [employer] [address]   Phone [phone]   TSH WITHOUT REFLEX    Narrative:     Performed at:  [facility]  [address] [place] [address] [facility] [employer]   Phone [phone]   URINE RT REFLEX TO CULTURE    Narrative:     Performed at:  [facility] was notified and they stated they will accept the patient. At [facility], patient had a generalized tonic-clonic seizure that lasted approximately 90 seconds. At that time he was given Ativan and Keppra. Vital signs remained stable. I feel the patient is stable to go over to the [facility]. We will await a room number. Copies of the CTs and x-rays were sent with the patient. The patient's blood pressure was found to be elevated according to CMS/Medicare and the Affordable Care Act/ObamaCare criteria. Elevated blood pressure could occur because of pain or anxiety or other reasons and does not mean that they need to have their blood pressure treated or medications otherwise adjusted. However, this could also be a sign that they will need to have their blood pressure treated or medications changed. The patient was instructed to follow up closely with their personal physician to have their blood pressure rechecked. The patient was instructed to take a list of recent blood pressure readings to their next visit with their personal physician. I reviewed old records     (This chart has been completed using [address]. Although attempts have been made to ensure accuracy, words and/or phrases may not be transcribed as intended.)    Patient refused pain medicines at the time of his exam.    IMPRESSION(S):  1. New onset seizure ([address]    2. Seizure ([address]        ?   Recheck Times: [id number], [id number], [id number]  Critical Care Time: 30 minutes         Peyman Lyles DO  01/07/20 3536

## 2020-01-08 LAB
EKG ATRIAL RATE: 83 BPM
EKG DIAGNOSIS: NORMAL
EKG P AXIS: 59 DEGREES
EKG P-R INTERVAL: 144 MS
EKG Q-T INTERVAL: 382 MS
EKG QRS DURATION: 98 MS
EKG QTC CALCULATION (BAZETT): 448 MS
EKG R AXIS: 75 DEGREES
EKG T AXIS: -21 DEGREES
EKG VENTRICULAR RATE: 83 BPM

## 2020-01-08 PROCEDURE — 93010 ELECTROCARDIOGRAM REPORT: CPT | Performed by: INTERNAL MEDICINE

## 2020-01-08 NOTE — ED NOTES
Pt having witnessed seizure, er doc called to bedside for medication, pt suctioned and oxygen reapplied. Seizure pads remain in place, vitals stable.       Franci Aguirre RN  01/07/20 9114

## 2021-01-04 ENCOUNTER — APPOINTMENT (OUTPATIENT)
Dept: GENERAL RADIOLOGY | Age: 61
End: 2021-01-04

## 2021-01-04 ENCOUNTER — HOSPITAL ENCOUNTER (EMERGENCY)
Age: 61
Discharge: ANOTHER HOSPITAL | End: 2021-01-05
Attending: EMERGENCY MEDICINE
Payer: OTHER GOVERNMENT

## 2021-01-04 ENCOUNTER — APPOINTMENT (OUTPATIENT)
Dept: CT IMAGING | Age: 61
End: 2021-01-04

## 2021-01-04 DIAGNOSIS — G40.901 STATUS EPILEPTICUS (HCC): Primary | ICD-10-CM

## 2021-01-04 LAB
A/G RATIO: 1.4 (ref 1.1–2.2)
ALBUMIN SERPL-MCNC: 4.2 G/DL (ref 3.4–5)
ALP BLD-CCNC: 116 U/L (ref 40–129)
ALT SERPL-CCNC: 30 U/L (ref 10–40)
ANION GAP SERPL CALCULATED.3IONS-SCNC: 25 MMOL/L (ref 3–16)
AST SERPL-CCNC: 25 U/L (ref 15–37)
BASE EXCESS ARTERIAL: -13 MMOL/L (ref -3–3)
BASE EXCESS ARTERIAL: -5.2 MMOL/L (ref -3–3)
BASOPHILS ABSOLUTE: 0 K/UL (ref 0–0.2)
BASOPHILS RELATIVE PERCENT: 0.2 %
BILIRUB SERPL-MCNC: <0.2 MG/DL (ref 0–1)
BUN BLDV-MCNC: 4 MG/DL (ref 7–20)
CALCIUM SERPL-MCNC: 10.3 MG/DL (ref 8.3–10.6)
CARBOXYHEMOGLOBIN ARTERIAL: 0.9 % (ref 0–1.5)
CARBOXYHEMOGLOBIN ARTERIAL: 1.3 % (ref 0–1.5)
CHLORIDE BLD-SCNC: 105 MMOL/L (ref 99–110)
CO2: 16 MMOL/L (ref 21–32)
CREAT SERPL-MCNC: 1 MG/DL (ref 0.9–1.3)
EOSINOPHILS ABSOLUTE: 0.2 K/UL (ref 0–0.6)
EOSINOPHILS RELATIVE PERCENT: 1.7 %
GFR AFRICAN AMERICAN: >60
GFR NON-AFRICAN AMERICAN: >60
GLOBULIN: 2.9 G/DL
GLUCOSE BLD-MCNC: 130 MG/DL (ref 70–99)
HCO3 ARTERIAL: 16.7 MMOL/L (ref 21–29)
HCO3 ARTERIAL: 21.2 MMOL/L (ref 21–29)
HCT VFR BLD CALC: 49.3 % (ref 40.5–52.5)
HEMOGLOBIN, ART, EXTENDED: 15.2 G/DL (ref 13.5–17.5)
HEMOGLOBIN, ART, EXTENDED: 16.2 G/DL (ref 13.5–17.5)
HEMOGLOBIN: 16.4 G/DL (ref 13.5–17.5)
LACTIC ACID, SEPSIS: 14.4 MMOL/L (ref 0.4–1.9)
LYMPHOCYTES ABSOLUTE: 3 K/UL (ref 1–5.1)
LYMPHOCYTES RELATIVE PERCENT: 32.6 %
MCH RBC QN AUTO: 31 PG (ref 26–34)
MCHC RBC AUTO-ENTMCNC: 33.3 G/DL (ref 31–36)
MCV RBC AUTO: 93.2 FL (ref 80–100)
METHEMOGLOBIN ARTERIAL: 0.4 %
METHEMOGLOBIN ARTERIAL: 0.5 %
MONOCYTES ABSOLUTE: 0.6 K/UL (ref 0–1.3)
MONOCYTES RELATIVE PERCENT: 6.2 %
NEUTROPHILS ABSOLUTE: 5.4 K/UL (ref 1.7–7.7)
NEUTROPHILS RELATIVE PERCENT: 59.3 %
O2 CONTENT ARTERIAL: 20 ML/DL
O2 CONTENT ARTERIAL: 22 ML/DL
O2 SAT, ARTERIAL: 96.2 %
O2 SAT, ARTERIAL: 98.5 %
O2 THERAPY: ABNORMAL
O2 THERAPY: ABNORMAL
PCO2 ARTERIAL: 43.3 MMHG (ref 35–45)
PCO2 ARTERIAL: 51.7 MMHG (ref 35–45)
PDW BLD-RTO: 14 % (ref 12.4–15.4)
PH ARTERIAL: 7.12 (ref 7.35–7.45)
PH ARTERIAL: 7.3 (ref 7.35–7.45)
PLATELET # BLD: 232 K/UL (ref 135–450)
PMV BLD AUTO: 8.9 FL (ref 5–10.5)
PO2 ARTERIAL: 174 MMHG (ref 75–108)
PO2 ARTERIAL: 92.3 MMHG (ref 75–108)
POTASSIUM SERPL-SCNC: 4.1 MMOL/L (ref 3.5–5.1)
RBC # BLD: 5.3 M/UL (ref 4.2–5.9)
SODIUM BLD-SCNC: 146 MMOL/L (ref 136–145)
TCO2 ARTERIAL: 18.3 MMOL/L
TCO2 ARTERIAL: 22.5 MMOL/L
TOTAL CK: 116 U/L (ref 39–308)
TOTAL PROTEIN: 7.1 G/DL (ref 6.4–8.2)
WBC # BLD: 9.1 K/UL (ref 4–11)

## 2021-01-04 PROCEDURE — 36415 COLL VENOUS BLD VENIPUNCTURE: CPT

## 2021-01-04 PROCEDURE — 6360000002 HC RX W HCPCS: Performed by: EMERGENCY MEDICINE

## 2021-01-04 PROCEDURE — 96365 THER/PROPH/DIAG IV INF INIT: CPT

## 2021-01-04 PROCEDURE — 2700000000 HC OXYGEN THERAPY PER DAY

## 2021-01-04 PROCEDURE — 85025 COMPLETE CBC W/AUTO DIFF WBC: CPT

## 2021-01-04 PROCEDURE — 93005 ELECTROCARDIOGRAM TRACING: CPT | Performed by: EMERGENCY MEDICINE

## 2021-01-04 PROCEDURE — 96367 TX/PROPH/DG ADDL SEQ IV INF: CPT

## 2021-01-04 PROCEDURE — 83605 ASSAY OF LACTIC ACID: CPT

## 2021-01-04 PROCEDURE — 94761 N-INVAS EAR/PLS OXIMETRY MLT: CPT

## 2021-01-04 PROCEDURE — 71045 X-RAY EXAM CHEST 1 VIEW: CPT

## 2021-01-04 PROCEDURE — 2580000003 HC RX 258: Performed by: EMERGENCY MEDICINE

## 2021-01-04 PROCEDURE — 31500 INSERT EMERGENCY AIRWAY: CPT

## 2021-01-04 PROCEDURE — 70450 CT HEAD/BRAIN W/O DYE: CPT

## 2021-01-04 PROCEDURE — 99285 EMERGENCY DEPT VISIT HI MDM: CPT

## 2021-01-04 PROCEDURE — 82803 BLOOD GASES ANY COMBINATION: CPT

## 2021-01-04 PROCEDURE — 80053 COMPREHEN METABOLIC PANEL: CPT

## 2021-01-04 PROCEDURE — 82550 ASSAY OF CK (CPK): CPT

## 2021-01-04 PROCEDURE — 36600 WITHDRAWAL OF ARTERIAL BLOOD: CPT

## 2021-01-04 PROCEDURE — 96375 TX/PRO/DX INJ NEW DRUG ADDON: CPT

## 2021-01-04 PROCEDURE — 6360000002 HC RX W HCPCS: Performed by: NURSE PRACTITIONER

## 2021-01-04 PROCEDURE — 6360000002 HC RX W HCPCS

## 2021-01-04 RX ORDER — MIDAZOLAM HYDROCHLORIDE 1 MG/ML
INJECTION INTRAMUSCULAR; INTRAVENOUS
Status: COMPLETED
Start: 2021-01-04 | End: 2021-01-04

## 2021-01-04 RX ORDER — LEVETIRACETAM 10 MG/ML
1000 INJECTION INTRAVASCULAR ONCE
Status: COMPLETED | OUTPATIENT
Start: 2021-01-04 | End: 2021-01-04

## 2021-01-04 RX ORDER — PROPOFOL 10 MG/ML
10 INJECTION, EMULSION INTRAVENOUS
Status: DISCONTINUED | OUTPATIENT
Start: 2021-01-04 | End: 2021-01-04

## 2021-01-04 RX ORDER — PROPOFOL 10 MG/ML
10 INJECTION, EMULSION INTRAVENOUS
Status: DISCONTINUED | OUTPATIENT
Start: 2021-01-04 | End: 2021-01-05 | Stop reason: HOSPADM

## 2021-01-04 RX ORDER — 0.9 % SODIUM CHLORIDE 0.9 %
1000 INTRAVENOUS SOLUTION INTRAVENOUS ONCE
Status: COMPLETED | OUTPATIENT
Start: 2021-01-04 | End: 2021-01-04

## 2021-01-04 RX ORDER — MIDAZOLAM HYDROCHLORIDE 1 MG/ML
5 INJECTION INTRAMUSCULAR; INTRAVENOUS ONCE
Status: COMPLETED | OUTPATIENT
Start: 2021-01-04 | End: 2021-01-04

## 2021-01-04 RX ORDER — 0.9 % SODIUM CHLORIDE 0.9 %
500 INTRAVENOUS SOLUTION INTRAVENOUS ONCE
Status: COMPLETED | OUTPATIENT
Start: 2021-01-04 | End: 2021-01-04

## 2021-01-04 RX ORDER — LORAZEPAM 2 MG/ML
2 INJECTION INTRAMUSCULAR ONCE
Status: COMPLETED | OUTPATIENT
Start: 2021-01-04 | End: 2021-01-04

## 2021-01-04 RX ORDER — PROPOFOL 10 MG/ML
INJECTION, EMULSION INTRAVENOUS
Status: COMPLETED
Start: 2021-01-04 | End: 2021-01-04

## 2021-01-04 RX ORDER — PROPOFOL 10 MG/ML
12 INJECTION, EMULSION INTRAVENOUS CONTINUOUS PRN
Status: DISCONTINUED | OUTPATIENT
Start: 2021-01-04 | End: 2021-01-05 | Stop reason: HOSPADM

## 2021-01-04 RX ADMIN — MIDAZOLAM 5 MG: 1 INJECTION INTRAMUSCULAR; INTRAVENOUS at 23:22

## 2021-01-04 RX ADMIN — LORAZEPAM 2 MG: 2 INJECTION INTRAMUSCULAR; INTRAVENOUS at 22:47

## 2021-01-04 RX ADMIN — LEVETIRACETAM 1000 MG: 10 INJECTION INTRAVENOUS at 18:15

## 2021-01-04 RX ADMIN — MIDAZOLAM HYDROCHLORIDE 5 MG: 1 INJECTION INTRAMUSCULAR; INTRAVENOUS at 23:22

## 2021-01-04 RX ADMIN — PROPOFOL 10 MCG/KG/MIN: 10 INJECTION, EMULSION INTRAVENOUS at 19:00

## 2021-01-04 RX ADMIN — SODIUM CHLORIDE 1000 ML: 9 INJECTION, SOLUTION INTRAVENOUS at 20:51

## 2021-01-04 RX ADMIN — SODIUM CHLORIDE 500 ML: 9 INJECTION, SOLUTION INTRAVENOUS at 18:15

## 2021-01-04 RX ADMIN — PHENYTOIN SODIUM 915 MG: 50 INJECTION INTRAMUSCULAR; INTRAVENOUS at 23:44

## 2021-01-04 RX ADMIN — PROPOFOL 10 MCG/KG/MIN: 10 INJECTION, EMULSION INTRAVENOUS at 19:14

## 2021-01-04 ASSESSMENT — PULMONARY FUNCTION TESTS
PIF_VALUE: 23
PIF_VALUE: 16

## 2021-01-04 NOTE — ED PROVIDER NOTES
[facility]  eMERGENCY dEPARTMENT eNCOUnter      Pt Name: Brock Chun  MRN: [id number]  [place] 1960  Date of evaluation: 1/4/2021  Provider: Fred Pace MD    [address] [place] [address]       Chief Complaint   Patient presents with    Seizures     Patient had 4 witnessed seizures in the past four hours         CRITICAL CARE TIME   Total Critical Care time was a minimum of 60 minutes, excluding separately reportable procedures. There was a high probability of clinically significant/life threatening deterioration in the patient's condition which required my urgent intervention. Critical care time includes my initial evaluation, ongoing reassessment, review of laboratory, EKG, chest x-ray, CT scan, consultation with neurology and [facility], [facility]. hospitalist for transfer. Review of old records. No procedure time was included. HISTORY OF PRESENT ILLNESS  (Location/Symptom, Timing/Onset, Context/Setting, Quality, Duration, Modifying Factors, Severity.)   Saige Riley is a 64 y.o. male who presents to the emergency department with a history that he had 4 witnessed seizures by his roommate. EMS reported that he has a history of seizures. I cannot find any history of seizures and has records here in care everywhere. Records from  show he had a right middle cerebral artery distribution stroke about 2 years ago, he had a thrombectomy. I see no seizure medications listed in his care everywhere records or in his records here. The patient wants actively seizing for the fourth time when EMS arrived. He was given 10 of Versed IM. His O2 saturations were low in route, they bagging him with a bag-valve-mask. They were no longer bagging when they arrived here, they had a nasal trumpet in place. Patient was unresponsive with labored respirations on arrival.  He was having some subtle focal seizures of his left upper extremity when he arrived.   I ordered 5 mg of Versed at that time. Nursing Notes were reviewed and I agree. REVIEW OF SYSTEMS    (2-9 systems for level 4, 10 or more for level 5)     Unable to obtain, altered mental status. Except as noted above the remainder of the review of systems was reviewed and negative. PAST MEDICAL HISTORY     Past Medical History:   Diagnosis Date    CAD (coronary artery disease)     CVA (cerebral vascular accident) ([address]     Nicotine addiction          SURGICAL HISTORY       Past Surgical History:   Procedure Laterality Date    CARDIAC CATHETERIZATION  2010         CURRENT MEDICATIONS       Previous Medications    ASPIRIN 81 MG CHEWABLE TABLET    Take 81 mg by mouth daily    CHOLECALCIFEROL (VITAMIN D PO)    Take 1 tablet by mouth daily    CLOPIDOGREL (PLAVIX) 75 MG TABLET    Take 1 tablet by mouth daily     METOPROLOL TARTRATE PO    Take 1 tablet by mouth 2 times daily    NITROGLYCERIN (NITROSTAT) 0.4 MG SL TABLET    Place 0.4 mg under the tongue every 5 minutes as needed for Chest pain    ROSUVASTATIN CALCIUM PO    Take 0.5 tablets by mouth    SERTRALINE HCL PO    Take 1 tablet by mouth daily       ALLERGIES     Patient has no known allergies. FAMILY HISTORY     History reviewed. No pertinent family history.        SOCIAL HISTORY       Social History     Socioeconomic History    Marital status: Single     Spouse name: None    Number of children: None    Years of education: None    Highest education level: None   Occupational History    None   Social Needs    Financial resource strain: None    Food insecurity     Worry: None     Inability: None    Transportation needs     Medical: None     Non-medical: None   Tobacco Use    Smoking status: Current Every Day Smoker     Packs/day: 1.00    Smokeless tobacco: Never Used   Substance and Sexual Activity    Alcohol use: Yes     Comment: occasionally     Drug use: Never    Sexual activity: Not Currently   Lifestyle    Physical activity     Days per week: None     Minutes per session: None    Stress: None   Relationships    Social connections     Talks on phone: None     Gets together: None     Attends Yarsanism service: None     Active member of club or organization: None     Attends meetings of clubs or organizations: None     Relationship status: None    Intimate partner violence     Fear of current or ex partner: None     Emotionally abused: None     Physically abused: None     Forced sexual activity: None   Other Topics Concern    None   Social History Narrative    None         PHYSICAL EXAM    (up to 7 for level 4, 8 or more for level 5)     ED Triage Vitals   BP Temp Temp src Pulse Resp SpO2 Height Weight   -- -- -- -- -- -- -- --       General: Unresponsive thin white male moderately dyspneic, respirations labored, focal seizures left upper extremity. Head: Atraumatic and normocephalic. Eyes: Pupils 4 mm, midline, reactive. ENT: No facial trauma. TMs normal.  Oropharynx moist without gag reflex. Neck: Supple, no adenopathy. Heart: Tachycardic, rate of 130. No murmurs or gallops noted. Lungs: Breath sounds decreased bilaterally with scattered rales and rhonchi. No wheezing. Abdomen: Soft, nondistended, no palpable masses. Musculoskeletal: Intact passive range of motion of his upper lower extremities. There is no obvious signs of trauma. Intact symmetrical distal pulses. Skin: Warm and dry, fair turgor. Neuro: No eye-opening, no verbal.  No withdrawal to painful stimuli. Focal seizure activity of the left upper extremity on arrival.      DIFFERENTIAL DIAGNOSIS   Differential includes but is not limited to status epilepticus, hypoglycemia, electrolyte disorder, intracerebral hemorrhage, thrombotic stroke, embolic stroke. DIAGNOSTIC RESULTS     EKG: All EKG's are interpreted by Loli Garcia MD in the absence of a cardiologist.    Sinus tachycardia, rate of 119, age-indeterminate inferior infarct.   Rhythm strip shows sinus tachycardia with a rate of 119, NH interval 140 ms, QRS 90 ms with no other ectopy as interpreted by me. This compared to an EKG dated 1/7/2020, the sinus tachycardia is new, no other significant change noted. RADIOLOGY:   Non-plain film images such as CT, Ultrasound and MRI are read by the radiologist. Plain radiographic images are visualized and preliminarily interpreted Modesta Tam MD with the below findings:      Interpretation per the Radiologist below, if available at the time of this note:    CT HEAD WO CONTRAST   Final Result   No acute intracranial abnormality. Right-sided encephalomalacia again demonstrated. XR CHEST PORTABLE   Final Result   1. Endotracheal tube 4 cm above the aislinn   2. No acute cardiopulmonary process demonstrated               ED BEDSIDE ULTRASOUND:   Performed by ED Physician - none    LABS:  Labs Reviewed   COMPREHENSIVE METABOLIC PANEL - Abnormal; Notable for the following components:       Result Value    Sodium 146 (*)     CO2 16 (*)     Anion Gap 25 (*)     Glucose 130 (*)     BUN 4 (*)     All other components within normal limits    Narrative:     Performed at:  [address] [place] [address]   Phone [phone]   BLOOD GAS, ARTERIAL - Abnormal; Notable for the following components:    pH, Arterial 7.118 (*)     pCO2, Arterial 51.7 (*)     pO2, Arterial 174.0 (*)     HCO3, Arterial 16.7 (*)     Base Excess, Arterial -13.0 (*)     All other components within normal limits    Narrative:     Marta Rodriguez tel. [phone],  Chemistry results called to and read back by Mendoza Lovelace RN. , 01/04/2021  18:08, by [facility]  Performed at:  [facility]  [address] [facility] [place] [address]   Phone [phone]   LACTATE, SEPSIS - Abnormal; Notable for the following components:    Lactic Acid, Sepsis 14.4 (*)     All other components within normal limits Narrative:     Danna Perez tel. [phone],  Chemistry results called to and read back by Heriberto Jimenes RN., 01/04/2021  18:14, by Valentine Jones  Performed at:  [facility]  [address] [facility] [place] [address]   Phone [phone]   BLOOD GAS, ARTERIAL - Abnormal; Notable for the following components:    pH, Arterial 7.297 (*)     Base Excess, Arterial -5.2 (*)     All other components within normal limits    Narrative:     Performed at:  [facility]  [address] [place], [place] [address]   Phone [phone]   CBC WITH AUTO DIFFERENTIAL    Narrative:     Performed at:  [facility]  [address] [place], [place] [address]   Phone [phone]   CK    Narrative:     Performed at:  [facility]  [facility] [address] [place], [place] [address]   Phone [phone]   LACTATE, SEPSIS   POCT GLUCOSE       All other labs were within normal range or not returned as of this dictation. EMERGENCY DEPARTMENT COURSE and DIFFERENTIAL DIAGNOSIS/MDM:   Vitals:    Vitals:    01/04/21 2244 01/04/21 2249 01/04/21 2310 01/04/21 2336   BP: (!) 155/94 (!) 153/109     Pulse: 109 109 119 104   Resp: 17 18 27 25   SpO2:   100% 92%   Weight: This patient arrived to the emergency room with a history that he had 4 witnessed seizures. EMS reported that he had a history of seizure disorder. In review of his old records here and at [facility] I cannot find any documentation of seizures or being on seizure medications. I did find documentation of a right middle cerebral artery aneurysm stroke that was treated with a thrombectomy in 2018. He had received 10 of Versed IM prior to arrival.  On arrival he had some subtle tonic-clonic activity primarily involving his upper extremity. He was given additional Versed. Keppra bolus was ordered, 1000 mg.   The patient had no gag reflex at this point in time. He was obviously in status epilepticus. In addition he was being given additional sedating medications. I felt that airway management was indicated. He was intubated as noted below. He had a significant lactic acidosis of 14.4 consistent with his prolonged seizure activity/status epilepticus. He had no acute findings on head CT, he had right-sided encephalomalacia similar to previous CT scan. He was not hypoglycemic. During his stay he had additional seizure activity. He was given Ativan. He had another episode that lasted at least 20 minutes. He was loaded with Dilantin. As of 0010 the patient is not seizing at this time. He is on IV propofol. He is on the ventilator. Felt that this patient would require continuous EEG monitoring. I consulted neurology at [facility]..  I spoke with Dr. Dinora Sales. He was in agreement. I am waiting to talk to the hospitalist to arrange for admission. 0005:  Discussed with Dr. Darnell Ramachandran, Neurology. He agrees that the patient would be a candidate for continuous EEG monitoring. He is agreeable with seeing the patient in consultation at [facility], [facility]..  We will page the hospitalist to admit. CONSULTS:  IP CONSULT TO NEUROLOGY    PROCEDURES:  Intubation    Date/Time: 1/4/2021 5:41 PM  Performed by: Luias Jimenez MD  Authorized by: Luisa Jimenez MD     Consent:     Consent obtained:  Emergent situation  Pre-procedure details:     Patient status:  Unresponsive    Mallampati score:  II    Pretreatment medications:  Midazolam    Paralytics:  Rocuronium  Procedure details:     Preoxygenation:  Bag valve mask    CPR in progress: no      Intubation method:  Oral    Oral intubation technique:  Direct    Laryngoscope blade:   Mac 3    Tube size (mm):  7.5    Tube type:  Cuffed    Number of attempts:  1    Cricoid pressure: no      Tube visualized through cords: yes    Placement assessment:     ETT to lip:  24    Tube secured with:  ETT diehl    Breath sounds:  Equal and absent over the epigastrium    Placement verification: chest rise, CXR verification, equal breath sounds and ETCO2 detector      CXR findings:  ETT in proper place  Post-procedure details:     Patient tolerance of procedure: Tolerated well, no immediate complications          FINAL IMPRESSION      1. Status epilepticus [facility])          DISPOSITION/PLAN   DISPOSITION Decision To Transfer 01/04/2021 11:56:31 PM      PATIENT REFERRED TO:  No follow-up provider specified.     DISCHARGE MEDICATIONS:  New Prescriptions    No medications on file       (Please note that portions of this note were completed with a voice recognition program.  Efforts were made to edit the dictations but occasionally words are mis-transcribed.)    Eliane Yost MD  Attending Emergency Physician        Eder Paige MD  01/06/21 340 Charles Luna MD  01/06/21 5891       Eder Paige MD  01/06/21 1248

## 2021-01-04 NOTE — ED NOTES
RT, this RN, Ganesh Pace, ЕКАТЕРИНА, and Dr. Harris Kanner at bedside for intubation.      Madhuri Mota RN  01/04/21 5257

## 2021-01-04 NOTE — ED NOTES
Verbal order from Dr. Alireza Rhodes for 5mg Versed. 5mg Versed given by Fahad Churchill, ЕКАТЕРИНА.       Joseph Almaguer RN  01/04/21 5795

## 2021-01-04 NOTE — ED NOTES
Patient to ED via [facility] EMS for 4 witnessed seizures by patient's roommate. Patient has known history of seizures and his roommate called EMS. Per EMS, upon their arrival, he was 60% on room air. Patient received 5mg versed IM by EMS and he was \"bagged\" en route, postictal. Upon arrival to ED, patient was nonresponsive and had nasal trumpet in place by EMS. Dr. Zadie Heimlich at bedside and verbal order for intubation. See additional notes.      Felipe Calderón, RN  01/04/21 9278 [facility], RN  01/04/21 7162

## 2021-01-04 NOTE — ED NOTES
Intubation completed by Dr. China Alvarez.  7.5 fr tube, 24 at the teeth, +color change      Nando Rollins RN  01/04/21 8627

## 2021-01-04 NOTE — ED NOTES
Verbal order for 35mg Rocuronium by Dr. Stuart Angela. Given by Yfn Ahumada, RN at this time.      Jenny Sibley RN  01/04/21 0421

## 2021-01-05 ENCOUNTER — HOSPITAL ENCOUNTER (INPATIENT)
Age: 61
LOS: 4 days | Discharge: HOME | DRG: 057 | End: 2021-01-09
Attending: INTERNAL MEDICINE | Admitting: INTERNAL MEDICINE
Payer: OTHER GOVERNMENT

## 2021-01-05 VITALS
SYSTOLIC BLOOD PRESSURE: 162 MMHG | RESPIRATION RATE: 23 BRPM | DIASTOLIC BLOOD PRESSURE: 109 MMHG | WEIGHT: 134.26 LBS | OXYGEN SATURATION: 100 % | BODY MASS INDEX: 21.67 KG/M2 | HEART RATE: 134 BPM

## 2021-01-05 PROBLEM — G40.901 STATUS EPILEPTICUS (HCC): Status: ACTIVE | Noted: 2021-01-05

## 2021-01-05 PROBLEM — J96.90 RESPIRATORY FAILURE REQUIRING INTUBATION (HCC): Status: ACTIVE | Noted: 2021-01-05

## 2021-01-05 LAB
AMPHETAMINE SCREEN, URINE: ABNORMAL
ANION GAP SERPL CALCULATED.3IONS-SCNC: 12 MMOL/L (ref 3–16)
BARBITURATE SCREEN URINE: ABNORMAL
BASE EXCESS ARTERIAL: -2.5 MMOL/L (ref -3–3)
BASOPHILS ABSOLUTE: 0.1 K/UL (ref 0–0.2)
BASOPHILS RELATIVE PERCENT: 0.5 %
BENZODIAZEPINE SCREEN, URINE: POSITIVE
BILIRUBIN URINE: NEGATIVE
BLOOD, URINE: ABNORMAL
BUN BLDV-MCNC: 5 MG/DL (ref 7–20)
CALCIUM SERPL-MCNC: 8.7 MG/DL (ref 8.3–10.6)
CANNABINOID SCREEN URINE: POSITIVE
CARBOXYHEMOGLOBIN ARTERIAL: 1.5 % (ref 0–1.5)
CHLORIDE BLD-SCNC: 108 MMOL/L (ref 99–110)
CLARITY: CLEAR
CO2: 25 MMOL/L (ref 21–32)
COCAINE METABOLITE SCREEN URINE: ABNORMAL
COLOR: YELLOW
CREAT SERPL-MCNC: 1.3 MG/DL (ref 0.9–1.3)
EKG ATRIAL RATE: 119 BPM
EKG ATRIAL RATE: 80 BPM
EKG DIAGNOSIS: NORMAL
EKG DIAGNOSIS: NORMAL
EKG P AXIS: 50 DEGREES
EKG P AXIS: 64 DEGREES
EKG P-R INTERVAL: 142 MS
EKG P-R INTERVAL: 148 MS
EKG Q-T INTERVAL: 322 MS
EKG Q-T INTERVAL: 394 MS
EKG QRS DURATION: 90 MS
EKG QRS DURATION: 90 MS
EKG QTC CALCULATION (BAZETT): 452 MS
EKG QTC CALCULATION (BAZETT): 454 MS
EKG R AXIS: 49 DEGREES
EKG R AXIS: 65 DEGREES
EKG T AXIS: -33 DEGREES
EKG T AXIS: 19 DEGREES
EKG VENTRICULAR RATE: 119 BPM
EKG VENTRICULAR RATE: 80 BPM
EOSINOPHILS ABSOLUTE: 0 K/UL (ref 0–0.6)
EOSINOPHILS RELATIVE PERCENT: 0 %
EPITHELIAL CELLS, UA: NORMAL /HPF (ref 0–5)
GFR AFRICAN AMERICAN: >60
GFR NON-AFRICAN AMERICAN: 57
GLUCOSE BLD-MCNC: 139 MG/DL (ref 70–99)
GLUCOSE URINE: NEGATIVE MG/DL
HCO3 ARTERIAL: 22 MMOL/L (ref 21–29)
HCT VFR BLD CALC: 43.8 % (ref 40.5–52.5)
HEMOGLOBIN, ART, EXTENDED: 15.1 G/DL
HEMOGLOBIN: 14.5 G/DL (ref 13.5–17.5)
KETONES, URINE: NEGATIVE MG/DL
LACTIC ACID, SEPSIS: 3.7 MMOL/L (ref 0.4–1.9)
LACTIC ACID: 1.9 MMOL/L (ref 0.4–2)
LACTIC ACID: 2.6 MMOL/L (ref 0.4–2)
LACTIC ACID: 2.6 MMOL/L (ref 0.4–2)
LEUKOCYTE ESTERASE, URINE: NEGATIVE
LYMPHOCYTES ABSOLUTE: 1.3 K/UL (ref 1–5.1)
LYMPHOCYTES RELATIVE PERCENT: 9.1 %
Lab: ABNORMAL
MCH RBC QN AUTO: 30.1 PG (ref 26–34)
MCHC RBC AUTO-ENTMCNC: 33 G/DL (ref 31–36)
MCV RBC AUTO: 91.4 FL (ref 80–100)
METHADONE SCREEN, URINE: ABNORMAL
METHEMOGLOBIN ARTERIAL: 0.6 % (ref 0–1.4)
MICROSCOPIC EXAMINATION: YES
MONOCYTES ABSOLUTE: 0.7 K/UL (ref 0–1.3)
MONOCYTES RELATIVE PERCENT: 4.9 %
NEUTROPHILS ABSOLUTE: 11.8 K/UL (ref 1.7–7.7)
NEUTROPHILS RELATIVE PERCENT: 85.5 %
NITRITE, URINE: NEGATIVE
O2 SAT, ARTERIAL: 100 % (ref 93–100)
OPIATE SCREEN URINE: ABNORMAL
OXYCODONE URINE: ABNORMAL
PCO2 ARTERIAL: 41.1 MMHG (ref 35–45)
PDW BLD-RTO: 14 % (ref 12.4–15.4)
PH ARTERIAL: 7.36 (ref 7.35–7.45)
PH UA: 6
PH UA: 6 (ref 5–8)
PHENCYCLIDINE SCREEN URINE: ABNORMAL
PLATELET # BLD: 176 K/UL (ref 135–450)
PMV BLD AUTO: 8.8 FL (ref 5–10.5)
PO2 ARTERIAL: 285 MMHG (ref 75–108)
POTASSIUM REFLEX MAGNESIUM: 3.6 MMOL/L (ref 3.5–5.1)
PROPOXYPHENE SCREEN: ABNORMAL
PROTEIN UA: NEGATIVE MG/DL
RBC # BLD: 4.8 M/UL (ref 4.2–5.9)
RBC UA: NORMAL /HPF (ref 0–4)
SODIUM BLD-SCNC: 145 MMOL/L (ref 136–145)
SPECIFIC GRAVITY UA: 1.01 (ref 1–1.03)
TCO2 ARTERIAL: 24 MMOL/L
TOTAL CK: 230 U/L (ref 39–308)
URINE TYPE: ABNORMAL
UROBILINOGEN, URINE: 0.2 E.U./DL
WBC # BLD: 13.8 K/UL (ref 4–11)
WBC UA: NORMAL /HPF (ref 0–5)

## 2021-01-05 PROCEDURE — 36415 COLL VENOUS BLD VENIPUNCTURE: CPT

## 2021-01-05 PROCEDURE — 6360000002 HC RX W HCPCS: Performed by: PHYSICIAN ASSISTANT

## 2021-01-05 PROCEDURE — 6360000002 HC RX W HCPCS: Performed by: STUDENT IN AN ORGANIZED HEALTH CARE EDUCATION/TRAINING PROGRAM

## 2021-01-05 PROCEDURE — 93010 ELECTROCARDIOGRAM REPORT: CPT | Performed by: INTERNAL MEDICINE

## 2021-01-05 PROCEDURE — 94002 VENT MGMT INPAT INIT DAY: CPT

## 2021-01-05 PROCEDURE — 83605 ASSAY OF LACTIC ACID: CPT

## 2021-01-05 PROCEDURE — 2580000003 HC RX 258: Performed by: PHYSICIAN ASSISTANT

## 2021-01-05 PROCEDURE — 80307 DRUG TEST PRSMV CHEM ANLYZR: CPT

## 2021-01-05 PROCEDURE — 81001 URINALYSIS AUTO W/SCOPE: CPT

## 2021-01-05 PROCEDURE — 94761 N-INVAS EAR/PLS OXIMETRY MLT: CPT

## 2021-01-05 PROCEDURE — 5A1935Z RESPIRATORY VENTILATION, LESS THAN 24 CONSECUTIVE HOURS: ICD-10-PCS | Performed by: INTERNAL MEDICINE

## 2021-01-05 PROCEDURE — 2580000003 HC RX 258: Performed by: STUDENT IN AN ORGANIZED HEALTH CARE EDUCATION/TRAINING PROGRAM

## 2021-01-05 PROCEDURE — 6370000000 HC RX 637 (ALT 250 FOR IP): Performed by: PHYSICIAN ASSISTANT

## 2021-01-05 PROCEDURE — 85025 COMPLETE CBC W/AUTO DIFF WBC: CPT

## 2021-01-05 PROCEDURE — C9113 INJ PANTOPRAZOLE SODIUM, VIA: HCPCS | Performed by: PHYSICIAN ASSISTANT

## 2021-01-05 PROCEDURE — 93005 ELECTROCARDIOGRAM TRACING: CPT | Performed by: PHYSICIAN ASSISTANT

## 2021-01-05 PROCEDURE — 2000000000 HC ICU R&B

## 2021-01-05 PROCEDURE — 80048 BASIC METABOLIC PNL TOTAL CA: CPT

## 2021-01-05 PROCEDURE — 99291 CRITICAL CARE FIRST HOUR: CPT | Performed by: INTERNAL MEDICINE

## 2021-01-05 PROCEDURE — 2700000000 HC OXYGEN THERAPY PER DAY

## 2021-01-05 PROCEDURE — 82803 BLOOD GASES ANY COMBINATION: CPT

## 2021-01-05 PROCEDURE — 82550 ASSAY OF CK (CPK): CPT

## 2021-01-05 RX ORDER — ACETAMINOPHEN 650 MG/1
650 SUPPOSITORY RECTAL EVERY 6 HOURS PRN
Status: DISCONTINUED | OUTPATIENT
Start: 2021-01-05 | End: 2021-01-09 | Stop reason: HOSPADM

## 2021-01-05 RX ORDER — POTASSIUM CHLORIDE 7.45 MG/ML
10 INJECTION INTRAVENOUS PRN
Status: DISCONTINUED | OUTPATIENT
Start: 2021-01-05 | End: 2021-01-08

## 2021-01-05 RX ORDER — CLOPIDOGREL BISULFATE 75 MG/1
75 TABLET ORAL DAILY
Status: DISCONTINUED | OUTPATIENT
Start: 2021-01-05 | End: 2021-01-09 | Stop reason: HOSPADM

## 2021-01-05 RX ORDER — SODIUM CHLORIDE 0.9 % (FLUSH) 0.9 %
10 SYRINGE (ML) INJECTION EVERY 12 HOURS SCHEDULED
Status: DISCONTINUED | OUTPATIENT
Start: 2021-01-05 | End: 2021-01-09 | Stop reason: HOSPADM

## 2021-01-05 RX ORDER — LORAZEPAM 2 MG/ML
2 INJECTION INTRAMUSCULAR EVERY 4 HOURS PRN
Status: DISCONTINUED | OUTPATIENT
Start: 2021-01-05 | End: 2021-01-09 | Stop reason: HOSPADM

## 2021-01-05 RX ORDER — ONDANSETRON 2 MG/ML
4 INJECTION INTRAMUSCULAR; INTRAVENOUS EVERY 6 HOURS PRN
Status: DISCONTINUED | OUTPATIENT
Start: 2021-01-05 | End: 2021-01-09 | Stop reason: HOSPADM

## 2021-01-05 RX ORDER — POLYETHYLENE GLYCOL 3350 17 G/17G
17 POWDER, FOR SOLUTION ORAL DAILY PRN
Status: DISCONTINUED | OUTPATIENT
Start: 2021-01-05 | End: 2021-01-09 | Stop reason: HOSPADM

## 2021-01-05 RX ORDER — ASPIRIN 81 MG/1
81 TABLET, CHEWABLE ORAL DAILY
Status: DISCONTINUED | OUTPATIENT
Start: 2021-01-05 | End: 2021-01-09 | Stop reason: HOSPADM

## 2021-01-05 RX ORDER — ACETAMINOPHEN 325 MG/1
650 TABLET ORAL EVERY 6 HOURS PRN
Status: DISCONTINUED | OUTPATIENT
Start: 2021-01-05 | End: 2021-01-09 | Stop reason: HOSPADM

## 2021-01-05 RX ORDER — TRAZODONE HYDROCHLORIDE 50 MG/1
50-100 TABLET ORAL NIGHTLY
COMMUNITY

## 2021-01-05 RX ORDER — 0.9 % SODIUM CHLORIDE 0.9 %
500 INTRAVENOUS SOLUTION INTRAVENOUS ONCE
Status: COMPLETED | OUTPATIENT
Start: 2021-01-05 | End: 2021-01-05

## 2021-01-05 RX ORDER — ROSUVASTATIN CALCIUM 10 MG/1
10 TABLET, COATED ORAL DAILY
COMMUNITY

## 2021-01-05 RX ORDER — ALBUTEROL SULFATE 90 UG/1
2 AEROSOL, METERED RESPIRATORY (INHALATION) EVERY 6 HOURS PRN
COMMUNITY

## 2021-01-05 RX ORDER — NITROGLYCERIN 0.4 MG/1
0.4 TABLET SUBLINGUAL EVERY 5 MIN PRN
Status: DISCONTINUED | OUTPATIENT
Start: 2021-01-05 | End: 2021-01-09 | Stop reason: HOSPADM

## 2021-01-05 RX ORDER — MAGNESIUM SULFATE IN WATER 40 MG/ML
2 INJECTION, SOLUTION INTRAVENOUS PRN
Status: DISCONTINUED | OUTPATIENT
Start: 2021-01-05 | End: 2021-01-09

## 2021-01-05 RX ORDER — THIAMINE HYDROCHLORIDE 100 MG/ML
100 INJECTION, SOLUTION INTRAMUSCULAR; INTRAVENOUS DAILY
Status: DISCONTINUED | OUTPATIENT
Start: 2021-01-05 | End: 2021-01-06

## 2021-01-05 RX ORDER — LEVETIRACETAM 500 MG/1
500 TABLET ORAL 2 TIMES DAILY
Status: ON HOLD | COMMUNITY
End: 2021-01-09 | Stop reason: SDUPTHER

## 2021-01-05 RX ORDER — PROMETHAZINE HYDROCHLORIDE 25 MG/1
12.5 TABLET ORAL EVERY 6 HOURS PRN
Status: DISCONTINUED | OUTPATIENT
Start: 2021-01-05 | End: 2021-01-09 | Stop reason: HOSPADM

## 2021-01-05 RX ORDER — SODIUM CHLORIDE 0.9 % (FLUSH) 0.9 %
10 SYRINGE (ML) INJECTION PRN
Status: DISCONTINUED | OUTPATIENT
Start: 2021-01-05 | End: 2021-01-09 | Stop reason: HOSPADM

## 2021-01-05 RX ORDER — PANTOPRAZOLE SODIUM 40 MG/10ML
40 INJECTION, POWDER, LYOPHILIZED, FOR SOLUTION INTRAVENOUS DAILY
Status: DISCONTINUED | OUTPATIENT
Start: 2021-01-05 | End: 2021-01-06

## 2021-01-05 RX ORDER — PROPOFOL 10 MG/ML
10 INJECTION, EMULSION INTRAVENOUS
Status: DISCONTINUED | OUTPATIENT
Start: 2021-01-05 | End: 2021-01-06

## 2021-01-05 RX ORDER — SODIUM CHLORIDE 9 MG/ML
INJECTION, SOLUTION INTRAVENOUS CONTINUOUS
Status: DISCONTINUED | OUTPATIENT
Start: 2021-01-05 | End: 2021-01-06

## 2021-01-05 RX ORDER — ROSUVASTATIN CALCIUM 5 MG/1
10 TABLET, COATED ORAL NIGHTLY
Status: DISCONTINUED | OUTPATIENT
Start: 2021-01-05 | End: 2021-01-09 | Stop reason: HOSPADM

## 2021-01-05 RX ADMIN — PANTOPRAZOLE SODIUM 40 MG: 40 INJECTION, POWDER, FOR SOLUTION INTRAVENOUS at 08:48

## 2021-01-05 RX ADMIN — SODIUM CHLORIDE: 9 INJECTION, SOLUTION INTRAVENOUS at 18:32

## 2021-01-05 RX ADMIN — LEVETIRACETAM 1000 MG: 100 INJECTION, SOLUTION INTRAVENOUS at 19:18

## 2021-01-05 RX ADMIN — PROPOFOL 15 MCG/KG/MIN: 10 INJECTION, EMULSION INTRAVENOUS at 11:47

## 2021-01-05 RX ADMIN — CLOPIDOGREL BISULFATE 75 MG: 75 TABLET, FILM COATED ORAL at 10:16

## 2021-01-05 RX ADMIN — SODIUM CHLORIDE: 9 INJECTION, SOLUTION INTRAVENOUS at 06:39

## 2021-01-05 RX ADMIN — SODIUM CHLORIDE 500 ML: 9 INJECTION, SOLUTION INTRAVENOUS at 05:03

## 2021-01-05 RX ADMIN — SODIUM CHLORIDE 500 ML: 9 INJECTION, SOLUTION INTRAVENOUS at 10:15

## 2021-01-05 RX ADMIN — ASPIRIN 81 MG: 81 TABLET, CHEWABLE ORAL at 10:14

## 2021-01-05 RX ADMIN — Medication 10 ML: at 08:45

## 2021-01-05 RX ADMIN — PROPOFOL 10 MCG/KG/MIN: 10 INJECTION, EMULSION INTRAVENOUS at 05:00

## 2021-01-05 RX ADMIN — LEVETIRACETAM 500 MG: 100 INJECTION, SOLUTION INTRAVENOUS at 08:43

## 2021-01-05 RX ADMIN — THIAMINE HYDROCHLORIDE 100 MG: 100 INJECTION, SOLUTION INTRAMUSCULAR; INTRAVENOUS at 10:15

## 2021-01-05 RX ADMIN — ENOXAPARIN SODIUM 40 MG: 40 INJECTION SUBCUTANEOUS at 08:48

## 2021-01-05 ASSESSMENT — PAIN SCALES - GENERAL: PAINLEVEL_OUTOF10: 0

## 2021-01-05 ASSESSMENT — PULMONARY FUNCTION TESTS
PIF_VALUE: 19
PIF_VALUE: 15
PIF_VALUE: 21
PIF_VALUE: 19
PIF_VALUE: 17
PIF_VALUE: 17
PIF_VALUE: 15

## 2021-01-05 NOTE — ED NOTES
16Fr avina placed using sterile technique  18 Fr OG placed to R side of mouth 55cm at lip     Fozia Mueller RN  01/04/21 1922

## 2021-01-05 NOTE — ED NOTES
MD at bedside to evaluate patient  Repeat lactic drawn and sent     Wilfrid ЕКАТЕРИНА Lindsay  01/05/21 0061

## 2021-01-05 NOTE — CONSULTS
ICU HISTORY AND [facility] Day:   ICU Day:                                                          Code:Full Code  Admit Date: 1/5/2021  PCP: No primary care provider on file. CC: seizures    HISTORY OF PRESENT ILLNESS:   Cristhian Hinojosa Jr is a 64 y. o. male with PMHx of CVA in 2019, seizure disorder, CAD who presents to [facility] ED for 4 witnessed seizures by his roommate. Per note, patient's O2 sat were low on EMS arrival to scene and  required bag mask in route. CBC in the ED was unremarkable. BP was remarkable for HCO3 16. Lactic acid was 14.4 and subsequent lactic acid was 3.7. ABG showed pH 7.118, pCO2 51.7, pO2 174, HCO3 16.7, O2 sat 98.5%. Vitals in ED showed /105, P 130, R 22, O2 sat 100%. CXR did not note any acute abnormality. Head Ct negative for acute intracranial pathology. He was noted to have subtle tonic clonic activity primarily to his upper extremity while in the ED. Received Keppra 1000 mg, 5mg Versed, 915 mg Dilantin, Ativan 2 mg, Keppra 1000 mg, started on propofol gtt. He was intubated for airway protection. Neurology was contacted and patient was  Transferred to [facility] ICU for continuous EEG monitoring. Patient seen at bedside, intubated and sedate. He is currently on propofol, will try weaning down to do cEEG per neurology; difficult to do due to patient becoming agitated and pulling at lines. PAST HISTORY:     Past Medical History:   Diagnosis Date    CAD (coronary artery disease)     [address] [address]) [address] [address]     Nicotine addiction        Past Surgical History:   Procedure Laterality Date    CARDIAC CATHETERIZATION  2010       SocialHistory:   The patient lives at home with roommate. Alcohol:  Illicit drugs: no use  Tobacco:      Family History:  No family history on file. MEDICATIONS:     No current facility-administered medications on file prior to encounter.       Current Outpatient Medications on File Prior to Encounter   Medication Sig Dispense Refill    aspirin 81 MG chewable tablet Take 81 mg by mouth daily      clopidogrel (PLAVIX) 75 MG tablet Take 1 tablet by mouth daily       SERTRALINE HCL PO Take 1 tablet by mouth daily      Cholecalciferol (VITAMIN D PO) Take 1 tablet by mouth daily      METOPROLOL TARTRATE PO Take 1 tablet by mouth 2 times daily      nitroGLYCERIN (NITROSTAT) 0.4 MG SL tablet Place 0.4 mg under the tongue every 5 minutes as needed for Chest pain      ROSUVASTATIN CALCIUM PO Take 0.5 tablets by mouth           Scheduled Meds:   aspirin  81 mg Oral Daily    clopidogrel  75 mg Oral Daily    [Held by provider] metoprolol tartrate  12.5 mg Oral BID    rosuvastatin  10 mg Oral Nightly    sodium chloride flush  10 mL Intravenous 2 times per day    enoxaparin  40 mg Subcutaneous Daily    thiamine  100 mg Intravenous Daily    pantoprazole  40 mg Intravenous Daily    levetiracetam  500 mg Intravenous Q12H      Continuous Infusions:   propofol 10 mcg/kg/min (01/05/21 0700)    sodium chloride 100 mL/hr at 01/05/21 0639     PRN Meds:nitroGLYCERIN, sodium chloride flush, promethazine **OR** ondansetron, polyethylene glycol, acetaminophen **OR** acetaminophen, potassium chloride, magnesium sulfate, LORazepam    Allergies: No Known Allergies    REVIEW OF SYSTEMS:       History obtained from unobtainable from patient due to mental status    Review of Systems    PHYSICAL EXAM:       Vitals: /77   Pulse 85   Temp 97.6 °F (36.4 °C) (Axillary)   Resp 19   Ht 5' 6\" (1.676 m)   Wt 134 lb 4.2 oz (60.9 kg)   SpO2 99%   BMI 21.67 kg/m²     I/O:      Intake/Output Summary (Last 24 hours) at 1/5/2021 0858  Last data filed at 1/5/2021 0700  Gross per 24 hour   Intake 543 ml   Output 220 ml   Net 323 ml     No intake/output data recorded. I/O last 3 completed shifts:   In: 543 [I.V.:543]  Out: 220 [Urine:220]    Physical Examination:     Physical Exam  Constitutional: Interventions: He is sedated and intubated. HENT:      Head: Normocephalic and atraumatic. Mouth/Throat:      Mouth: Mucous membranes are moist.      Pharynx: Oropharynx is clear. Cardiovascular:      Rate and Rhythm: Regular rhythm. Tachycardia present. Heart sounds: Normal heart sounds. No murmur. No friction rub. No gallop. Pulmonary:      Effort: He is intubated. Breath sounds: No wheezing, rhonchi or rales. Abdominal:      General: Abdomen is flat. Bowel sounds are normal.      Palpations: Abdomen is soft. Musculoskeletal:      Right lower leg: No edema. Left lower leg: No edema. Skin:     General: Skin is warm and dry. Access:                     -Peripheral Access Day#:1                                  Briggs Day#:1                                      ETT Day#:1  Vent Settings: Vent Mode: AC/PRVC Rate Set: 16 bmp/Vt Ordered: 450 mL/ /FiO2 : 60 %    Recent Labs     01/04/21 1900 01/05/21  0538   PHART 7.297* 7.355   YGP0VRI 43.3 41.1   PO2ART 92.3 285.0*           DATA:       Labs:  CBC:   Recent Labs     01/04/21  1746 01/05/21  0622   WBC 9.1 13.8*   HGB 16.4 14.5   HCT 49.3 43.8    176       BMP:   Recent Labs     01/04/21 1746 01/05/21  0504   * 145   K 4.1 3.6    108   CO2 16* 25   BUN 4* 5*   CREATININE 1.0 1.3   GLUCOSE 130* 139*     LFT's:   Recent Labs     01/04/21  1746   AST 25   ALT 30   BILITOT <0.2   ALKPHOS 116     Troponin: No results for input(s): TROPONINI in the last 72 hours. BNP:No results for input(s): BNP in the last 72 hours. ABGs:   Recent Labs     01/04/21  1900 01/05/21  0538   PHART 7.297* 7.355   IUV6OZM 43.3 41.1   PO2ART 92.3 285.0*     INR: No results for input(s): INR in the last 72 hours.     U/A:  Recent Labs     01/05/21  0504 01/05/21  0527   COLORU Yellow  --    PHUR 6.0 6.0   WBCUA 0-2  --    RBCUA 0-2  --    CLARITYU Clear  --    SPECGRAV 1.015  --    LEUKOCYTESUR Negative  --    UROBILINOGEN 0.2  -- BILIRUBINUR Negative  --    BLOODU TRACE-LYSED*  --    GLUCOSEU Negative  --        No orders to display       EKG: NSR, with ST depression in inferior leads    ASSESSMENT AND PLAN:     Ayleen Coulter a 64 y. o. male with PMHx of CVA in 2019, seizure disorder, CAD who presents to [facility] ED for status epilepticus     Status Epilepticus  - Head CT unremarkable  - Propofol gtt. Ativan 2mg PRN  - cEEG per Neuro recommendations   - intubated for airway protection. R 16, Vt 500, PEEP 5, FIO2 45%. AB.355/41.1/285.0/22  - seizure precuations. NPO  - Neurology consulted  - check UDS: positive for benzodiazapine and cannabinoids, CK: 230 , PT/INR  - give thiamine   - Keppra 1000mg BID    Respiratory failure, assoc with seizure activity and treatment  Underwent SBT, demonstrates adequate ventilation and gas exchange, and is successfully extubated.     Lactic acidosis  - Lactic acid 14.4 with repeat 3.7, resolving with cessation of seizure activity  - will trend      Hypotension  - IVF bolus ordered  - likely 2/2 sedation      Hx CVA  - Hx right MCA distribution about 2 years ago s/p thrombectomy at   - ASA, plavix  - statin     CAD   - ASA, Plavix  - Last ECHO w/ estimated ejection fraction was in the range of 50% to 55%. Echo contrast study showed a moderate to large right-to-left atrial level shunt      Code Status:Full Code  FEN: protonix, NPO  PPX:  Lovenox  DISPO:     This patient has been staffed and discussed with Dr. Damian Butcher MD.   -----------------------------  Corie Deng DO, PGY-1  2021  8:58 AM  Patient examined, findings as discussed with Dr Ines Au. Agree with assessment and plan as edited above.   Time spent in critical care 45 min

## 2021-01-05 NOTE — ED NOTES
Patient hypotensive, MD aware, 1L NS placed on pressure bag, patient responsive to IVF     Gill Crawley, RN  01/04/21 4223

## 2021-01-05 NOTE — CONSULTS
Neurology Consult Note  Reason for Consult:Status Epilepticus   Chief complaint:seizures  Dr Shakira Jang, DO asked me to see Jennifer Sauceda in consultation for evaluation of seizures      History of Present Illness:  Jennifer Sauceda is a 64 y.o. male  PMH of CAD, MI(2010) HLD and CVA (2018) and previous seizure activity (2019) and (2020) presented to [facility] after witnessed seizure tonic clonic like activity. Per notes the patients room mate reported seizure activity and again in the ED at [facility]. Patient received Keppra, versed, dilantin and ativan and was subsequently intubated and started on propofol gtt for airway protection  Patient transferred form [facility] to [facility] ICU for further evaluation and cVEEG. CT head at presentation shows no acute intracranial abnormality, or hemmorhage but again with R sided encephalomalacia     The patient is currently heavily sedated as was pulling at ET tube. Medical History:  Past Medical History:   Diagnosis Date    CAD (coronary artery disease)     CVA (cerebral vascular accident) ([address]     Nicotine addiction      Previous CVA in 2018  Tele for arrhythmia was negative at the time   Echo showed mod to large r-->L atrial shunt   CVA in 2018 M1 occlusion with R ICA stenosis 55% No tPA given but thrombectomy. Subsequent follow imagine showed hemorraghic conversion  In the R basal ganglia/ caudate.        Past Surgical History:   Procedure Laterality Date    CARDIAC CATHETERIZATION  2010     Medications Prior to Admission: aspirin 81 MG chewable tablet, Take 81 mg by mouth daily  clopidogrel (PLAVIX) 75 MG tablet, Take 1 tablet by mouth daily   SERTRALINE HCL PO, Take 1 tablet by mouth daily  Cholecalciferol (VITAMIN D PO), Take 1 tablet by mouth daily  METOPROLOL TARTRATE PO, Take 1 tablet by mouth 2 times daily  nitroGLYCERIN (NITROSTAT) 0.4 MG SL tablet, Place 0.4 mg under the tongue every 5 minutes as needed for Chest pain  ROSUVASTATIN CALCIUM PO, Take 0.5 able and extubate patient when able. - Would continue AED on discharge   -F/U with neurology outpatient in 1 month  -Instruct patient on any driving restrictions given h/o epilepsy   -Switch to q4hour neuro checks once extubated and transfer patient       The patient  will be discussed with Dr Mayuri Wagner.       A copy of this note was provided for  [facility], DO     Evenings, weekends, and off weeks please discuss neurologist on-call

## 2021-01-05 NOTE — ED NOTES
Patient seizing in [address].  MD aware, verbal order for 5mg versed  MD at bedside to evaluate patient     Mickie Dorado RN  01/04/21 7282

## 2021-01-05 NOTE — PLAN OF CARE
Problem: Restraint Use - Nonviolent/Non-Self-Destructive Behavior:  Goal: Absence of restraint indications  Description: Absence of restraint indications  Outcome: Ongoing  Note: Patient has critical airway, continued use for restraints needed. Patient and spouse educated on purpose of restraints. No evidence of injury. Continued use for restraints. Patients restraints assessed every hour. See Flowsheets.

## 2021-01-05 NOTE — H&P
Internal Medicine PGY- 1 Resident History & Physical ICU      PCP: No primary care provider on file. Date of Admission: (Not on file)    Chief Complaint:  seizure    HPI:   Tess Flood is a 64 y.o. male with PMHx of CVA in 2019, seizure disorder, CAD who presents to [facility] ED for 4 witnessed seizures by his roommate. Per note, patient's O2 sat were low on EMS arrival to scene and  required bag mask in route. CBC in the ED was unremarkable. BP was remarkable for HCO3 16. Lactic acid was 14.4 and subsequent lactic acid was 3.7. ABG showed pH 7.118, pCO2 51.7, pO2 174, HCO3 16.7, O2 sat 98.5%. Vitals in ED showed /105, P 130, R 22, O2 sat 100%. CXR did not note any acute abnormality. Head Ct negative for acute intracranial pathology. He was noted to have subtle tonic clonic activity primarily to his upper extremity while in the ED. Received Keppra 1000 mg, 5mg Versed, 915 mg Dilantin, Ativan 2 mg, Keppra 1000 mg, started on propofol gtt. He was intubated for airway protection. Neurology was contacted and patient was  Transferred to [facility] ICU for continuous EEG monitoring. Past Medical History:          Diagnosis Date    CAD (coronary artery disease)     [address] [facility]rebral vascular accident) [address] [address]     Nicotine addiction        PastSurgical History:          Procedure Laterality Date    CARDIAC CATHETERIZATION  2010       Medications Prior to Admission:      Prior to Admission medications    Medication Sig Start Date End Date Taking?  Authorizing Provider   aspirin 81 MG chewable tablet Take 81 mg by mouth daily 5/5/10   Historical Provider, MD   clopidogrel (PLAVIX) 75 MG tablet Take 1 tablet by mouth daily     Historical Provider, MD   SERTRALINE HCL PO Take 1 tablet by mouth daily    Historical Provider, MD   Cholecalciferol (VITAMIN D PO) Take 1 tablet by mouth daily    Historical Provider, MD   METOPROLOL TARTRATE PO Take 1 tablet by mouth 2 times daily    Historical Provider, MD nitroGLYCERIN (NITROSTAT) 0.4 MG SL tablet Place 0.4 mg under the tongue every 5 minutes as needed for Chest pain 5/5/10   Historical Provider, MD   ROSUVASTATIN CALCIUM PO Take 0.5 tablets by mouth    Historical Provider, MD       Allergies: Patient has no known allergies. Social History:      TOBACCO:   reports that he has been smoking. He has been smoking about 1.00 pack per day. He has never used smokeless tobacco.  ETOH:  reports current alcohol use. Family History:     No family history on file. PHYSICAL EXAM PERFORMED:    There were no vitals taken for this visit. Physical Exam  Vitals signs reviewed. HENT:      Head: Normocephalic. Mouth/Throat:      Mouth: Mucous membranes are moist.   Eyes:      Pupils: Pupils are equal, round, and reactive to light. Pulmonary:      Comments: Mechanical breath sounds  Abdominal:      Palpations: Abdomen is soft. Skin:     General: Skin is warm. Neurological:      Comments: intubated         Labs:     Recent Labs     01/04/21  1746   WBC 9.1   HGB 16.4   HCT 49.3        Recent Labs     01/04/21  1746   *   K 4.1      CO2 16*   BUN 4*   CREATININE 1.0   CALCIUM 10.3     Recent Labs     01/04/21  1746   AST 25   ALT 30   BILITOT <0.2   ALKPHOS 116     No results for input(s): INR in the last 72 hours. Recent Labs     01/04/21  1746   CKTOTAL 116       Urinalysis:      Lab Results   Component Value Date    NITRU Negative 01/07/2020    BLOODU Negative 01/07/2020    SPECGRAV 1.011 01/07/2020    GLUCOSEU Negative 01/07/2020       Radiology:     CXR: I have reviewed the CXR with the following interpretation:     No orders to display       ASSESSMENT & PLAN:  Cecilia Jenkins is a 64 y.o. male with PMHx of CVA in 2019, seizure disorder, CAD who presents to [facility] ED for status epilepticus    Status Epilepticus  - Head CT unremarkable  - Propofol gtt.  Ativan 2mg PRN  - cEEG per Neuro recommendations   - intubated for airway protection. R 15, Vt 450, PEEP 5, FIO2 75%. Pending ABG  - seizure precuations. NPO  - Neurology consulted  - check UDS, CK, PT/INR  - give thiamine     Lactic acidosis  - Lactic acid 14.4 with repeat 3.7  - will trend     Hypotension  - IVF bolus ordered  - likely 2/2 sedation     Hx CVA  - Hx right MCA distribution about 2 years ago s/p thrombectomy at   - ASA, plavix  - statin    CAD   - ASA, Plavix  - Last ECHO w/ estimated ejection fraction was in the range of 50% to 55%. Echo contrast study showed a moderate to large right-to-left atrial level shunt    GI Prophylaxis: PPI  Sedation: propofol  DVT Prophylaxis: lovenox  Fluids: NS  IV access: PIV in both arms   Intubated: Yes  Diet: No diet orders on file    I will discuss the patient with Dr. Juliano Clancy MD  Internal Medicine Resident, PGY- 1  Perfect serve

## 2021-01-05 NOTE — PROGRESS NOTES
Hospitalist Progress Note      PCP: No primary care provider on file. Date of Admission: 1/5/2021    Chief Complaint: Seizure    Subjective: Seen at bedside, patient extubated today. Opens eyes to voice, nods head, denies complaints, but not wanting to answer questions . Medications:  Reviewed    Infusion Medications    propofol 10 mcg/kg/min (01/05/21 0700)    sodium chloride 100 mL/hr at 01/05/21 6421     Scheduled Medications    aspirin  81 mg Oral Daily    clopidogrel  75 mg Oral Daily    [Held by provider] metoprolol tartrate  12.5 mg Oral BID    rosuvastatin  10 mg Oral Nightly    sodium chloride flush  10 mL Intravenous 2 times per day    enoxaparin  40 mg Subcutaneous Daily    thiamine  100 mg Intravenous Daily    pantoprazole  40 mg Intravenous Daily    levetiracetam  500 mg Intravenous Q12H     PRN Meds: nitroGLYCERIN, sodium chloride flush, promethazine **OR** ondansetron, polyethylene glycol, acetaminophen **OR** acetaminophen, potassium chloride, magnesium sulfate, LORazepam      Intake/Output Summary (Last 24 hours) at 1/5/2021 0833  Last data filed at 1/5/2021 0700  Gross per 24 hour   Intake 543 ml   Output 220 ml   Net 323 ml       Exam:    /77   Pulse 85   Temp 97.6 °F (36.4 °C) (Axillary)   Resp 19   Ht 5' 6\" (1.676 m)   Wt 134 lb 4.2 oz (60.9 kg)   SpO2 99%   BMI 21.67 kg/m²     General appearance: No apparent distress, appears stated age and cooperative. HEENT: Pupils equal, round, and reactive to light. Conjunctivae/corneas clear. Neck: Supple, with full range of motion. No jugular venous distention. Trachea midline. Respiratory:  Normal respiratory effort. Clear to auscultation, bilaterally without Rales/Wheezes/Rhonchi. Cardiovascular: Regular rate and rhythm with normal S1/S2 without murmurs, rubs or gallops. Abdomen: Soft, non-tender, non-distended with normal bowel sounds. Musculoskelatal: No clubbing, cyanosis or edema bilaterally.     Skin: Skin color, texture, turgor normal.  No rashes or lesions. Neurologic:  Wakes to voice, speaks few words, independently moving all extremities. Labs:   Recent Labs     01/04/21 1746 01/05/21  0622   WBC 9.1 13.8*   HGB 16.4 14.5   HCT 49.3 43.8    176     Recent Labs     01/04/21  1746 01/05/21  0504   * 145   K 4.1 3.6    108   CO2 16* 25   BUN 4* 5*   CREATININE 1.0 1.3   CALCIUM 10.3 8.7     Recent Labs     01/04/21  1746   AST 25   ALT 30   BILITOT <0.2   ALKPHOS 116     No results for input(s): INR in the last 72 hours. Recent Labs     01/04/21 1746 01/05/21  0504   CKTOTAL 116 230       Studies:  No orders to display       Assessment/Plan:    Active Hospital Problems    Diagnosis Date Noted    Status epilepticus ([facility] [address] Johnny Ott 01/05/2021   Lactic acidosis likely due to above. History of CVA   Mild Hypotension, resolved  CAD  KINGS    Neurology consulted  Keppra dose increase  Trending lactic acid  On IV fluids  Continue aspirin and plavix, statin   On thiamine  Seizure precautions.      DVT Prophylaxis: Lovenox  Diet: Diet NPO Effective Now  Code Status: Full Code    PT/OT Eval Status:     Dispo - Inpatient    Nelida Chávez DO

## 2021-01-05 NOTE — ED NOTES
Patient with small jerking like motions to LLE, no seizure noted at this time, BP and HR increased, MD aware     Itz Orta RN  01/05/21 4434

## 2021-01-05 NOTE — ED NOTES
Patient resting comfortably on vent, no seizure like activity noted     Malcom Palacios RN  01/05/21 2214

## 2021-01-05 NOTE — PROGRESS NOTES
Patient arrived intubated. CHG bath and sacral heart applied. Propofol and IV fluids started. ICU residents at bedside to assess patient. Patient has equal reactive pupils and withdraws from pain. Propofol paused while blood pressure is soft.

## 2021-01-05 NOTE — PROGRESS NOTES
4 Eyes Admission Assessment     I agree as the admission nurse that 2 RN's have performed a thorough Head to Toe Skin Assessment on the patient. ALL assessment sites listed below have been assessed on admission. Areas assessed by both nurses:   [x]   Head, Face, and Ears   [x]   Shoulders, Back, and Chest  [x]   Arms, Elbows, and Hands   [x]   Coccyx, Sacrum, and Ischium  [x]   Legs, Feet, and Heels        Does the Patient have Skin Breakdown? NO        Chandan Prevention initiated:  YES  Wound Care Orders initiated:  NO      [facility] nurse consulted for Pressure Injury (Stage 3,4, Unstageable, DTI, NWPT, and Complex wounds) or Chandan score 18 or lower:  NO, scattered bruising      Nurse 1 eSignature: Electronically signed by Katheryn Wilburn RN on 1/5/21 at 6:35 AM EST    **SHARE this note so that the co-signing nurse is able to place an eSignature**    Nurse 2 eSignature: Electronically signed by Jewel Ortega.  Shawna Montoya on 1/5/21 at 6:37 AM EST

## 2021-01-05 NOTE — ED NOTES
Pt is having seizure like activity, Dr. Wale Arguello notified and aware, called CT to get pt down.       Vanessa Neumann RN  01/04/21 2173

## 2021-01-06 PROBLEM — J41.1 MUCOPURULENT CHRONIC BRONCHITIS (HCC): Status: ACTIVE | Noted: 2021-01-06

## 2021-01-06 LAB
ANION GAP SERPL CALCULATED.3IONS-SCNC: 12 MMOL/L (ref 3–16)
BASOPHILS ABSOLUTE: 0 K/UL (ref 0–0.2)
BASOPHILS RELATIVE PERCENT: 0.4 %
BUN BLDV-MCNC: 10 MG/DL (ref 7–20)
CALCIUM SERPL-MCNC: 9.5 MG/DL (ref 8.3–10.6)
CHLORIDE BLD-SCNC: 110 MMOL/L (ref 99–110)
CO2: 23 MMOL/L (ref 21–32)
CREAT SERPL-MCNC: 1.4 MG/DL (ref 0.9–1.3)
EOSINOPHILS ABSOLUTE: 0 K/UL (ref 0–0.6)
EOSINOPHILS RELATIVE PERCENT: 0 %
GFR AFRICAN AMERICAN: >60
GFR NON-AFRICAN AMERICAN: 52
GLUCOSE BLD-MCNC: 113 MG/DL (ref 70–99)
HCT VFR BLD CALC: 40.7 % (ref 40.5–52.5)
HEMOGLOBIN: 13.8 G/DL (ref 13.5–17.5)
INR BLD: 1.04 (ref 0.86–1.14)
LYMPHOCYTES ABSOLUTE: 1.5 K/UL (ref 1–5.1)
LYMPHOCYTES RELATIVE PERCENT: 14.3 %
MAGNESIUM: 1.7 MG/DL (ref 1.8–2.4)
MCH RBC QN AUTO: 30.3 PG (ref 26–34)
MCHC RBC AUTO-ENTMCNC: 33.9 G/DL (ref 31–36)
MCV RBC AUTO: 89.3 FL (ref 80–100)
MONOCYTES ABSOLUTE: 0.8 K/UL (ref 0–1.3)
MONOCYTES RELATIVE PERCENT: 7.5 %
NEUTROPHILS ABSOLUTE: 8.4 K/UL (ref 1.7–7.7)
NEUTROPHILS RELATIVE PERCENT: 77.8 %
PDW BLD-RTO: 14 % (ref 12.4–15.4)
PHOSPHORUS: 2.2 MG/DL (ref 2.5–4.9)
PLATELET # BLD: 153 K/UL (ref 135–450)
PMV BLD AUTO: 9.4 FL (ref 5–10.5)
POTASSIUM REFLEX MAGNESIUM: 3.2 MMOL/L (ref 3.5–5.1)
PROTHROMBIN TIME: 12.1 SEC (ref 10–13.2)
RBC # BLD: 4.55 M/UL (ref 4.2–5.9)
SODIUM BLD-SCNC: 145 MMOL/L (ref 136–145)
TOTAL CK: 2901 U/L (ref 39–308)
WBC # BLD: 10.8 K/UL (ref 4–11)

## 2021-01-06 PROCEDURE — 6370000000 HC RX 637 (ALT 250 FOR IP): Performed by: PHYSICIAN ASSISTANT

## 2021-01-06 PROCEDURE — 2580000003 HC RX 258: Performed by: PHYSICIAN ASSISTANT

## 2021-01-06 PROCEDURE — 85025 COMPLETE CBC W/AUTO DIFF WBC: CPT

## 2021-01-06 PROCEDURE — 94664 DEMO&/EVAL PT USE INHALER: CPT

## 2021-01-06 PROCEDURE — 2700000000 HC OXYGEN THERAPY PER DAY

## 2021-01-06 PROCEDURE — 94761 N-INVAS EAR/PLS OXIMETRY MLT: CPT

## 2021-01-06 PROCEDURE — 97161 PT EVAL LOW COMPLEX 20 MIN: CPT

## 2021-01-06 PROCEDURE — 97166 OT EVAL MOD COMPLEX 45 MIN: CPT

## 2021-01-06 PROCEDURE — 85610 PROTHROMBIN TIME: CPT

## 2021-01-06 PROCEDURE — 36415 COLL VENOUS BLD VENIPUNCTURE: CPT

## 2021-01-06 PROCEDURE — 2500000003 HC RX 250 WO HCPCS: Performed by: STUDENT IN AN ORGANIZED HEALTH CARE EDUCATION/TRAINING PROGRAM

## 2021-01-06 PROCEDURE — 82550 ASSAY OF CK (CPK): CPT

## 2021-01-06 PROCEDURE — 80048 BASIC METABOLIC PNL TOTAL CA: CPT

## 2021-01-06 PROCEDURE — 84100 ASSAY OF PHOSPHORUS: CPT

## 2021-01-06 PROCEDURE — 94010 BREATHING CAPACITY TEST: CPT

## 2021-01-06 PROCEDURE — 6360000002 HC RX W HCPCS: Performed by: STUDENT IN AN ORGANIZED HEALTH CARE EDUCATION/TRAINING PROGRAM

## 2021-01-06 PROCEDURE — 97530 THERAPEUTIC ACTIVITIES: CPT

## 2021-01-06 PROCEDURE — 6370000000 HC RX 637 (ALT 250 FOR IP): Performed by: STUDENT IN AN ORGANIZED HEALTH CARE EDUCATION/TRAINING PROGRAM

## 2021-01-06 PROCEDURE — 99233 SBSQ HOSP IP/OBS HIGH 50: CPT | Performed by: INTERNAL MEDICINE

## 2021-01-06 PROCEDURE — 2580000003 HC RX 258: Performed by: STUDENT IN AN ORGANIZED HEALTH CARE EDUCATION/TRAINING PROGRAM

## 2021-01-06 PROCEDURE — 97116 GAIT TRAINING THERAPY: CPT

## 2021-01-06 PROCEDURE — 97535 SELF CARE MNGMENT TRAINING: CPT

## 2021-01-06 PROCEDURE — C9113 INJ PANTOPRAZOLE SODIUM, VIA: HCPCS | Performed by: PHYSICIAN ASSISTANT

## 2021-01-06 PROCEDURE — 6360000002 HC RX W HCPCS: Performed by: PHYSICIAN ASSISTANT

## 2021-01-06 PROCEDURE — 83735 ASSAY OF MAGNESIUM: CPT

## 2021-01-06 PROCEDURE — 94799 UNLISTED PULMONARY SVC/PX: CPT

## 2021-01-06 PROCEDURE — 1200000000 HC SEMI PRIVATE

## 2021-01-06 RX ORDER — LANOLIN ALCOHOL/MO/W.PET/CERES
100 CREAM (GRAM) TOPICAL DAILY
Status: DISCONTINUED | OUTPATIENT
Start: 2021-01-06 | End: 2021-01-09 | Stop reason: HOSPADM

## 2021-01-06 RX ORDER — PANTOPRAZOLE SODIUM 40 MG/1
40 TABLET, DELAYED RELEASE ORAL
Status: DISCONTINUED | OUTPATIENT
Start: 2021-01-07 | End: 2021-01-06

## 2021-01-06 RX ORDER — HEPARIN SODIUM 5000 [USP'U]/ML
5000 INJECTION, SOLUTION INTRAVENOUS; SUBCUTANEOUS EVERY 8 HOURS SCHEDULED
Status: DISCONTINUED | OUTPATIENT
Start: 2021-01-06 | End: 2021-01-09 | Stop reason: HOSPADM

## 2021-01-06 RX ORDER — POTASSIUM CHLORIDE 7.45 MG/ML
10 INJECTION INTRAVENOUS ONCE
Status: COMPLETED | OUTPATIENT
Start: 2021-01-06 | End: 2021-01-06

## 2021-01-06 RX ORDER — MAGNESIUM SULFATE IN WATER 40 MG/ML
2 INJECTION, SOLUTION INTRAVENOUS ONCE
Status: COMPLETED | OUTPATIENT
Start: 2021-01-06 | End: 2021-01-06

## 2021-01-06 RX ORDER — SODIUM CHLORIDE 450 MG/100ML
INJECTION, SOLUTION INTRAVENOUS CONTINUOUS
Status: DISCONTINUED | OUTPATIENT
Start: 2021-01-06 | End: 2021-01-06

## 2021-01-06 RX ORDER — ALBUTEROL SULFATE 2.5 MG/3ML
2.5 SOLUTION RESPIRATORY (INHALATION) EVERY 6 HOURS PRN
Status: DISCONTINUED | OUTPATIENT
Start: 2021-01-06 | End: 2021-01-09 | Stop reason: HOSPADM

## 2021-01-06 RX ORDER — POTASSIUM CHLORIDE 20 MEQ/1
40 TABLET, EXTENDED RELEASE ORAL ONCE
Status: COMPLETED | OUTPATIENT
Start: 2021-01-06 | End: 2021-01-06

## 2021-01-06 RX ORDER — LEVETIRACETAM 500 MG/1
1000 TABLET ORAL 2 TIMES DAILY
Status: DISCONTINUED | OUTPATIENT
Start: 2021-01-06 | End: 2021-01-09 | Stop reason: HOSPADM

## 2021-01-06 RX ADMIN — POTASSIUM CHLORIDE 10 MEQ: 7.46 INJECTION, SOLUTION INTRAVENOUS at 08:24

## 2021-01-06 RX ADMIN — CLOPIDOGREL BISULFATE 75 MG: 75 TABLET, FILM COATED ORAL at 08:07

## 2021-01-06 RX ADMIN — Medication 100 MG: at 10:25

## 2021-01-06 RX ADMIN — ASPIRIN 81 MG: 81 TABLET, CHEWABLE ORAL at 08:07

## 2021-01-06 RX ADMIN — LEVETIRACETAM 1000 MG: 100 INJECTION, SOLUTION INTRAVENOUS at 06:51

## 2021-01-06 RX ADMIN — SODIUM BICARBONATE: 84 INJECTION, SOLUTION INTRAVENOUS at 16:31

## 2021-01-06 RX ADMIN — SODIUM CHLORIDE: 9 INJECTION, SOLUTION INTRAVENOUS at 05:56

## 2021-01-06 RX ADMIN — PANTOPRAZOLE SODIUM 40 MG: 40 INJECTION, POWDER, FOR SOLUTION INTRAVENOUS at 08:08

## 2021-01-06 RX ADMIN — Medication 10 ML: at 20:18

## 2021-01-06 RX ADMIN — METOPROLOL TARTRATE 12.5 MG: 25 TABLET, FILM COATED ORAL at 20:17

## 2021-01-06 RX ADMIN — MAGNESIUM SULFATE HEPTAHYDRATE 2 G: 40 INJECTION, SOLUTION INTRAVENOUS at 08:08

## 2021-01-06 RX ADMIN — LEVETIRACETAM 1000 MG: 500 TABLET ORAL at 18:45

## 2021-01-06 RX ADMIN — POTASSIUM CHLORIDE 40 MEQ: 1500 TABLET, EXTENDED RELEASE ORAL at 08:07

## 2021-01-06 RX ADMIN — ROSUVASTATIN CALCIUM 10 MG: 5 TABLET, COATED ORAL at 20:17

## 2021-01-06 RX ADMIN — SODIUM BICARBONATE: 84 INJECTION, SOLUTION INTRAVENOUS at 10:01

## 2021-01-06 RX ADMIN — METOPROLOL TARTRATE 12.5 MG: 25 TABLET, FILM COATED ORAL at 08:07

## 2021-01-06 ASSESSMENT — PAIN SCALES - GENERAL
PAINLEVEL_OUTOF10: 0
PAINLEVEL_OUTOF10: 0

## 2021-01-06 ASSESSMENT — ENCOUNTER SYMPTOMS
DIARRHEA: 0
CHEST TIGHTNESS: 0
ABDOMINAL PAIN: 0
VOMITING: 0
SHORTNESS OF BREATH: 0
NAUSEA: 0
COUGH: 1

## 2021-01-06 NOTE — PROGRESS NOTES
Pt. Resting in bed comfortably at this time. Tested patient's swallow, passed. Alert and oriented. Knew he was at the hospital, but had to be told which. Responding to commands in all 4 extremities, however weak in the L. Arm. No questions or concerns at this time. Will continue to monitor.

## 2021-01-06 NOTE — PROGRESS NOTES
ICU Progress Note    Admit Date: 1/5/2021  Day: 2  Vent Day: None  IV Access:Peripheral  IV Fluids:None  Vasopressors:None                Antibiotics: None  Diet: DIET CARDIAC;    CC: Status Epilepticus     Interval history:     Patient seen at bedside, no acute events overnight and was extubated yesterday. Patient denies any complaints. Denies fever, chills, chest pain, shortness of breath, nausea, headache, or dizziness. Patient's condition is stable enough to be transferred out of the ICU.     Medications:     Scheduled Meds:   levETIRAcetam  1,000 mg Oral BID    thiamine  100 mg Oral Daily    heparin (porcine)  5,000 Units Subcutaneous 3 times per day    aspirin  81 mg Oral Daily    clopidogrel  75 mg Oral Daily    metoprolol tartrate  12.5 mg Oral BID    rosuvastatin  10 mg Oral Nightly    sodium chloride flush  10 mL Intravenous 2 times per day     Continuous Infusions:   sodium bicarbonate infusion 150 mL/hr at 01/06/21 1001     PRN Meds:albuterol, nitroGLYCERIN, sodium chloride flush, promethazine **OR** ondansetron, polyethylene glycol, acetaminophen **OR** acetaminophen, potassium chloride, magnesium sulfate, LORazepam    Objective:   Vitals:   T-max:  Patient Vitals for the past 8 hrs:   BP Temp Temp src Pulse Resp SpO2   01/06/21 1100 (!) 147/92 -- -- 80 -- --   01/06/21 1000 127/83 -- -- 73 -- (!) 88 %   01/06/21 0926 -- -- -- -- -- 93 %   01/06/21 0924 -- -- -- -- 19 98 %   01/06/21 0900 (!) 142/84 -- -- 78 -- --   01/06/21 0800 (!) 146/77 98.8 °F (37.1 °C) Oral 72 16 (!) 89 %   01/06/21 0700 134/83 -- -- 73 -- 92 %   01/06/21 0600 130/78 -- -- 76 -- 91 %   01/06/21 0500 129/87 -- -- 73 -- 92 %   01/06/21 0400 (!) 140/80 99 °F (37.2 °C) Oral 81 -- 90 %       Intake/Output Summary (Last 24 hours) at 1/6/2021 1157  Last data filed at 1/6/2021 1000  Gross per 24 hour   Intake 2669.98 ml   Output 3600 ml   Net -930.02 ml       Access:   -Peripheral Access Day#:2    Physical Exam    General: He is MCV 93.2 91.4 89.3     Renal:    Recent Labs     01/04/21  1746 01/05/21  0504 01/06/21  0516 01/06/21  0518   * 145  --  145   K 4.1 3.6  --  3.2*    108  --  110   CO2 16* 25  --  23   BUN 4* 5*  --  10   CREATININE 1.0 1.3  --  1.4*   GLUCOSE 130* 139*  --  113*   CALCIUM 10.3 8.7  --  9.5   MG  --   --   --  1.70*   PHOS  --   --  2.2*  --    ANIONGAP 25* 12  --  12     Hepatic:   Recent Labs     01/04/21 1746   AST 25   ALT 30   BILITOT <0.2   PROT 7.1   LABALBU 4.2   ALKPHOS 116     Troponin: No results for input(s): TROPONINI in the last 72 hours. BNP: No results for input(s): BNP in the last 72 hours. Lipids: No results for input(s): CHOL, HDL in the last 72 hours. Invalid input(s): LDLCALCU, TRIGLYCERIDE  ABGs:    Recent Labs     01/04/21  1800 01/04/21  1900 01/05/21  0538   PHART 7.118* 7.297* 7.355   KWX9XHQ 51.7* 43.3 41.1   PO2ART 174.0* 92.3 285.0*   CHI8NJF 16.7* 21.2 22   BEART -13.0* -5.2* -2.5   T0ISAFHU 98.5 96.2 100   BFV2WGV 18.3 22.5 24       INR:   Recent Labs     01/06/21  0518   INR 1.04     Lactate: No results for input(s): LACTATE in the last 72 hours. Cultures:  -----------------------------------------------------------------  RAD:   No orders to display         Assessment/Plan:     Jessy Flores Jr is a 64 y. o. male with PMHx of CVA in 2019, unknown seizure disorder, CAD who presents to [facility] ED for status epilepticus     Status Epilepticus  Had 4-seizure like episodes over a period of several hours with altered mental status  - Head CT unremarkable  - Propofol gtt. Ativan 2mg PRN   - seizure precuations.  NPO  - Neurology consulted, cEEG when patient is no longer restless  - UDS: positive for benzodiazapine, likely secondary to administration from healthcare workers, and cannabinoids, not consequential.   - give thiamine   - IV Keppra 1000mg BID     KINGS 2/2 Rhabdomyolysis   CK- 2901, trending upwards following seizures on 01/04/2021  - IV NS  -trend CK, Phos, K, Ca, myoglobin, LDH     Acute Kidney Injury  Cr 1-->1.3 in 24 hours, Cr-1.4 currently trending upwards, Patient was hypotensive upon admission  -IV NS + KCl PRN  -trend BUN, Cr     Lactic acidosis  - Lactic acid 14.4--> 1.9, resolving with cessation of seizure activity  -AG- 11 on 01/06/2021  - resolved, D/C Lactate trend     Hypotension  - IVF bolus ordered  - likely 2/2 sedation      Hx CVA  - Hx right MCA distribution about 2 years ago s/p thrombectomy at   - ASA, plavix  - statin     CAD   - ASA, Plavix  - Last ECHO w/ estimated ejection fraction was in the range of 50% to 55%. Echo contrast study showed a moderate to large right-to-left atrial level shunt     Chronic Bronchitis/ COPD? Bilateral wheezes with an occasional cough with clear phlegm. Pt also has a long smoking history. -CXR does not show parenchymal opacities  -Pulmonary function tests are not available  -Resp Cx to r/o infection  -Albuterol given on scheduled basis    Code Status:Full Code  FEN: DIET CARDIAC;  PPX:  SQH  DISPO: ICU    [place] Area, DO, PGY-1  01/06/21  11:57 AM    This patient has been staffed and discussed with Dr. Jean Easton MD.    Patient examined, findings as discussed with Dr. Katina Gudino. Agree with assessment and plan as edited above. Seizure disorder requires higher dosing of Keppra. Will have PT/OT evaluation. COPD evident, not an active problem.

## 2021-01-06 NOTE — PROGRESS NOTES
Patient noted with no seizure activity throughout shift thus far. In bed resting, bed in lowest position, alarm on and call light in reach. Will continue to monitor.

## 2021-01-06 NOTE — PROGRESS NOTES
Occupational Therapy   Occupational Therapy Initial Assessment and Treatment Note   Date: 2021   Patient Name: Cecilia Jenkins  MRN: [id number]     : 1960    Date of Service: 2021    Discharge Recommendations:Winston Villafana scored a 21/24 on the AM-PAC ADL Inpatient form. Current research shows that an AM-PAC score of 18 or greater is typically associated with a discharge to the patient's home setting. Based on the patient's AM-PAC score, and their current ADL deficits, it is recommended that the patient have 2-3 sessions per week of Occupational Therapy at d/c to increase the patient's independence. At this time, this patient demonstrates the endurance and safety to discharge home with Perfecto Gardner  and a follow up treatment frequency of 2-3x/wk. Please see assessment section for further patient specific details. If patient discharges prior to next session this note will serve as a discharge summary. Please see below for the latest assessment towards goals. S Level 1  OT Equipment Recommendations  Equipment Needed: No    Assessment   Performance deficits / Impairments: Decreased functional mobility ; Decreased ADL status; Decreased endurance  Assessment: Pt from home with roommate. Pt demo functioning slightly below baseline level. Anticipate good progress toward goals. Pt plans for d/c Home with roommate to assist. No DME needs identified. Recommend HHOT eval for safety. Will follow as inpt  Treatment Diagnosis: impaired ADLs /functional transfers 2/2 Seizures  Prognosis: Fair  Decision Making: Medium Complexity  OT Education: OT Role;Plan of Care  Patient Education: verb understanding - reinforce as needed  REQUIRES OT FOLLOW UP: Yes  Activity Tolerance  Activity Tolerance: Patient Tolerated treatment well  Activity Tolerance: No MARTINEZ noted. Pt O2 sat on RA 96%  Safety Devices  Safety Devices in place: Yes  Type of devices: Call light within reach; Left in chair;Chair alarm in place;Nurse notified           Patient Diagnosis(es): There were no encounter diagnoses. has a past medical history of CAD (coronary artery disease), CVA (cerebral vascular accident) ([facility] [address] and Nicotine addiction. has a past surgical history that includes Cardiac catheterization (2010). Treatment Diagnosis: impaired ADLs /functional transfers 2/2 Seizures      Restrictions  Position Activity Restriction  Other position/activity restrictions: up with assist    Subjective   General  Chart Reviewed: Yes  Additional Pertinent Hx: Admit 1/5 with multiple seizures from [facility] - intubated 1/4-extubated 1/5    CT head- Right-sided encephalomalacia again demonstrated, cxray - neg                           PMHX: CAD, CVA - right middle cerebral artery distribution stroke, + smoker  Family / Caregiver Present: No  Diagnosis: Seizures  Subjective  Subjective: \" I just want to get the hell out of here\" pt in bed agreeable for OOB/OT eval and tx. Pt reports frustrated and wants to leave - RN aware  Patient Currently in Pain: Denies  Vital Signs  Pulse: 80  BP: (!) 147/92  MAP (mmHg): 108  Patient Currently in Pain: Denies  Oxygen Therapy  SpO2: (!) 88 %    Social/Functional History  Social/Functional History  Type of Home: House  Home Layout: One level  Home Access: Stairs to enter with rails  Bathroom Shower/Tub: Tub/Shower unit  Bathroom Toilet: Standard  Bathroom Equipment: Shower chair, Grab bars in shower  Home Equipment: Quad cane(motorized scooter)  Receives Help From: Friend(s)  ADL Assistance: Independent  Homemaking Assistance: Needs assistance(Roddy roommate - assist with cleaning / cooking / medication management)  Ambulation Assistance: Independent  Transfer Assistance: Independent  Active : Yes  Occupation: On disability  Additional Comments: Pt reports Jayashree Priest can help as needed at home. Pt is open to home care. Objective Treatment included functional transfer training, ADL's and pt. education. Strength  Gross RUE Strength: WFL  R Hand General: 4+/5     Hand Dominance  Hand Dominance: Right     Plan   Plan  Times per week: 2-5x  Times per day: Daily  Current Treatment Recommendations: Endurance Training, Safety Education & Training, Self-Care / ADL, Equipment Evaluation, Education, & procurement, Patient/Caregiver Education & Training, Functional Mobility Training    AM-PAC Score  AM-PAC Inpatient Daily Activity Raw Score: 21 (01/06/21 1116)  AM-PAC Inpatient ADL T-Scale Score : 44.27 (01/06/21 1116)  ADL Inpatient CMS 0-100% Score: 32.79 (01/06/21 1116)  ADL Inpatient CMS G-Code Modifier : Loretha Dandy (01/06/21 1116)    Goals  Short term goals  Time Frame for Short term goals: at d/c  Short term goal 1: Stance x 7 mins with Supervision for ADLs  Short term goal 2: LE Dressing with Mod independence  Short term goal 3: Commode transfers with Mod independence  Patient Goals   Patient goals : Go home ASAP     Therapy Time   Individual Concurrent Group Co-treatment   Time In 1035         Time Out 1113         Minutes 38              Timed Code Treatment Minutes:   23 mins     Total Treatment Minutes:  38 mins       Angel Shannon OT

## 2021-01-06 NOTE — CARE COORDINATION
Case Management Assessment           Daily Note                 Date/ Time of Note: 1/6/2021 2:03 PM         Note completed by: Elysia Eaton    Patient Name: Moses Mc  YOB: 1960    Diagnosis:Status epilepticus [facility]) [M46.189]  Patient Admission Status: Inpatient    Date of Admission:1/5/2021  4:46 AM Length of Stay: 1 GLOS:      Current Plan of Care: possible transfer to [facility]  ________________________________________________________________________________________  PT AM-PAC: 18 / 24 per last evaluation on: 01/06    OT AM-PAC: 21 / 24 per last evaluation on: 01/06    DME Needs for discharge: has a cane and scooter at home  ________________________________________________________________________________________  Discharge Plan: Home    Tentative discharge date: TBD    Current barriers to discharge: none noted    Referrals completed: Not Applicable    Resources/ information provided: Not indicated at this time  ________________________________________________________________________________________  Case Management Notes:I spoke with patient to ask about home care but he is not interested. He will follow up with his PCP at the [place]. He lives with Lakesha Hart who will be there 24/7 and they have a friend who can transport him to home at d/c.     [employer] and his family were provided with choice of provider; he and his family are in agreement with the discharge plan.     Care Transition Patient: Hallie Eaton RN  [facility]  Case Management Department  Ph: [phone]  Fax: [phone]

## 2021-01-06 NOTE — PROGRESS NOTES
Shift handoff completed, patient alert and oriented x 4, able to move all extremities and follow commands, refuses to answer questions at time and ignores staff, avina in place and draining adequate, on 2L O2 and tolerating well, will titrate when able, no complaints of pain, in bed, bed in lowest position, call light in reach and alarm activated. IVF infusing and will continue to monitor.

## 2021-01-06 NOTE — PROGRESS NOTES
Pt. EJ dressing changed and taped back into place multiple times. Probably won't last much longer. Pt. Will not let me place a new IV and the one in his L. Arm hurts him when flushed, however, he will now allow me to remove it. Will cont. To monitor.

## 2021-01-06 NOTE — PROGRESS NOTES
(cerebral vascular accident) ([facility] [address] and Nicotine addiction. has a past surgical history that includes Cardiac catheterization (2010). Restrictions  Position Activity Restriction  Other position/activity restrictions: up with assist  Vision/Hearing  Vision: Within Functional Limits  Hearing: Within functional limits     Subjective  General  Chart Reviewed: Yes  Additional Pertinent Hx: Admit 1/4 to OSH with seizures, intubated and transferred to [facility]; head CT: (-); PMHx: CAD, CVA  Referring Practitioner: DO Kyler  Diagnosis: status epilepticus  Subjective  Subjective: Pt found supine in bed. Agreeable to PT with min encouragement. Pt hopes to d/c home soon. Pain Screening  Patient Currently in Pain: Denies       Orientation  Orientation  Overall Orientation Status: Within Functional Limits  Social/Functional History  Social/Functional History  Type of Home: House  Home Layout: One level  Home Access: Stairs to enter with rails  Bathroom Shower/Tub: Tub/Shower unit  Bathroom Toilet: Standard  Bathroom Equipment: Shower chair, Grab bars in shower  Home Equipment: Quad cane(motorized scooter)  Receives Help From: Friend(s)  ADL Assistance: Independent  Homemaking Assistance: Needs assistance(Roddy sampson - assist with cleaning / cooking / medication management)  Ambulation Assistance: Independent  Transfer Assistance: Independent  Active : Yes  Occupation: On disability  Additional Comments: Pt reports Octavio Alvarado can help as needed at home. Pt is open to home care.   Cognition        Objective          AROM RLE (degrees)  RLE AROM: WFL  AROM LLE (degrees)  LLE General AROM: knee ext decreased ~30 deg, ankle DF to neutral - pt states baseline since CVA  Strength RLE  Comment: 4+/5 knee ext, ankle DF  Strength LLE  Comment: 4-/5 knee ext, 4/5 ankle DF (baseline L LE weakness since CVA per pt report)        Bed mobility  Supine to Sit: Contact guard assistance  Transfers  Sit to Stand: Minimal Assistance(from toilet; CGA from EOB)  Stand to sit: Contact guard assistance  Ambulation  Ambulation?: Yes  Ambulation 1  Device: Large Casey Sincere  Assistance: Contact guard assistance  Quality of Gait: slow and effortful, fairly steady with cane, step to pattern, decreased WB through L LE  Distance: 10 ft, 30 ft     Balance  Sitting - Static: Fair  Sitting - Dynamic: Fair  Standing - Static: Fair  Standing - Dynamic: Fair      Treatment included gait and transfer training, pt education. Plan   Plan  Times per week: 2-5  Current Treatment Recommendations: Strengthening, Balance Training, Functional Mobility Training, Transfer Training, Gait Training, Patient/Caregiver Education & Training  Safety Devices  Type of devices: Chair alarm in place, Gait belt, Nurse notified, Call light within reach, Left in chair    G-Code       OutComes Score                                                  AM-PAC Score  AM-PAC Inpatient Mobility Raw Score : 18 (01/06/21 1127)  AM-PAC Inpatient T-Scale Score : 43.63 (01/06/21 1127)  Mobility Inpatient CMS 0-100% Score: 46.58 (01/06/21 1127)  Mobility Inpatient CMS G-Code Modifier : CK (01/06/21 1127)          Goals  Short term goals  Time Frame for Short term goals: discharge  Short term goal 1: Pt will transfer supine <--> sit with supervision  Short term goal 2: Pt will transfer sit <--> stand with supervision  Short term goal 3: pt will amb 40 ft with QC and SBA  Patient Goals   Patient goals : return home       Therapy Time   Individual Concurrent Group Co-treatment   Time In 1030         Time Out 1110         Minutes 40                 Timed Code Treatment Minutes:  25    Total Treatment Minutes:  40    If patient is discharged prior to next treatment, this note will serve as the discharge summary.   Raymond Jarrett, PT, DPT  [id number]

## 2021-01-06 NOTE — CONSULTS
 CAD (coronary artery disease)     CVA (cerebral vascular accident) ([facility] [address]     Nicotine addiction        Past Surgical History:   Procedure Laterality Date    CARDIAC CATHETERIZATION  2010       SocialHistory:   The patient lives at home with roommate. Alcohol: denies EtOH use  Illicit drugs: no use  Tobacco:  Patient is a smoker    Family History:  No family history on file. MEDICATIONS:     No current facility-administered medications on file prior to encounter.       Current Outpatient Medications on File Prior to Encounter   Medication Sig Dispense Refill    metoprolol tartrate (LOPRESSOR) 25 MG tablet Take 25 mg by mouth 2 times daily      traZODone (DESYREL) 50 MG tablet Take  mg by mouth nightly      albuterol sulfate HFA (VENTOLIN HFA) 108 (90 Base) MCG/ACT inhaler Inhale 2 puffs into the lungs every 6 hours as needed for Wheezing      levETIRAcetam (KEPPRA) 500 MG tablet Take 500 mg by mouth 2 times daily      aspirin 81 MG chewable tablet Take 81 mg by mouth daily      clopidogrel (PLAVIX) 75 MG tablet Take 1 tablet by mouth daily       nitroGLYCERIN (NITROSTAT) 0.4 MG SL tablet Place 0.4 mg under the tongue every 5 minutes as needed for Chest pain      rosuvastatin (CRESTOR) 10 MG tablet Take 10 mg by mouth daily           Scheduled Meds:   aspirin  81 mg Oral Daily    clopidogrel  75 mg Oral Daily    metoprolol tartrate  12.5 mg Oral BID    rosuvastatin  10 mg Oral Nightly    sodium chloride flush  10 mL Intravenous 2 times per day    enoxaparin  40 mg Subcutaneous Daily    thiamine  100 mg Intravenous Daily    pantoprazole  40 mg Intravenous Daily    levetiracetam  1,000 mg Intravenous Q12H      Continuous Infusions:   propofol Stopped (01/05/21 1501)    sodium chloride 100 mL/hr at 01/06/21 0556 PRN Meds:nitroGLYCERIN, sodium chloride flush, promethazine **OR** ondansetron, polyethylene glycol, acetaminophen **OR** acetaminophen, potassium chloride, magnesium sulfate, LORazepam    Allergies: No Known Allergies    REVIEW OF SYSTEMS:           Review of Systems   Respiratory: Positive for cough. Negative for chest tightness and shortness of breath. When asked if he coughs up anything, he states clear phlegm and that \"its been going on for a while\"   Cardiovascular: Negative for chest pain. Gastrointestinal: Negative for abdominal pain, diarrhea, nausea and vomiting. Psychiatric/Behavioral: Negative for agitation, confusion and decreased concentration. PHYSICAL EXAM:       Vitals: /78   Pulse 76   Temp 99 °F (37.2 °C) (Oral)   Resp 16   Ht 5' 6\" (1.676 m)   Wt 134 lb 4.2 oz (60.9 kg)   SpO2 91%   BMI 21.67 kg/m²     I/O:      Intake/Output Summary (Last 24 hours) at 1/6/2021 0753  Last data filed at 1/6/2021 0600  Gross per 24 hour   Intake 2769.98 ml   Output 3405 ml   Net -635.02 ml     No intake/output data recorded. I/O last 3 completed shifts: In: 7569 [I.V.:2170; IV Piggyback:850]  Out: 6275 [DFRKK:1560]    Physical Examination:     Physical Exam  Constitutional:       General: He is awake. He is not in acute distress. Comments: AAOx3. The patient can answer most questions, yet does appear to be confused at times. Patient appears older than his reported age. He is not clean shaven and doesn't appear to take care of his personal hygiene   HENT:      Head: Normocephalic and atraumatic. Comments: Patient has poor dentition and is missing some teeth     Mouth/Throat:      Mouth: Mucous membranes are moist.      Pharynx: Oropharynx is clear. Cardiovascular:      Rate and Rhythm: Normal rate. Comments: It was very difficult to hear the heart sounds of this patient over the sounds of his wheezes. Pulmonary:      Effort: No respiratory distress. Breath sounds: Examination of the right-upper field reveals wheezing. Examination of the left-upper field reveals wheezing. Examination of the right-lower field reveals wheezing. Examination of the left-lower field reveals wheezing. Wheezing present. No rhonchi or rales. Comments: Pt has very loud wheezes B/L, yet is in no apparent distress  Chest:      Chest wall: No tenderness. Abdominal:      General: Abdomen is flat. Bowel sounds are normal. There is no distension. Palpations: Abdomen is soft. Tenderness: There is no abdominal tenderness. There is no guarding. Musculoskeletal:      Right lower leg: No edema. Left lower leg: No edema. Skin:     General: Skin is warm and dry. Neurological:      Mental Status: He is alert, oriented to person, place, and time and easily aroused. Sensory: Sensation is intact. Motor: Weakness present. No seizure activity. Comments: Patient has weakness 1/5 in his left arm from his stroke in 2019. Psychiatric:         Mood and Affect: Mood normal.         Behavior: Behavior normal. Behavior is cooperative.            Access:                     -Peripheral Access Day#:1                                  Briggs Day#:1                                      ETT Day#:1  Vent Settings: Vent Mode: AC/PRVC Rate Set: 16 bmp/Vt Ordered: 500 mL/ /FiO2 : 45 %    Recent Labs     01/04/21  1900 01/05/21  0538   PHART 7.297* 7.355   QIG8IMG 43.3 41.1   PO2ART 92.3 285.0*           DATA:       Labs:  CBC:   Recent Labs     01/04/21  1746 01/05/21  0622 01/06/21  0518   WBC 9.1 13.8* 10.8   HGB 16.4 14.5 13.8   HCT 49.3 43.8 40.7    176 153       BMP:   Recent Labs     01/04/21  1746 01/05/21  0504 01/06/21  0518   * 145 145   K 4.1 3.6 3.2*    108 110   CO2 16* 25 23   BUN 4* 5* 10   CREATININE 1.0 1.3 1.4*   GLUCOSE 130* 139* 113*     LFT's:   Recent Labs     01/04/21  1746   AST 25   ALT 30   BILITOT <0.2   ALKPHOS 116 Troponin: No results for input(s): TROPONINI in the last 72 hours. BNP:No results for input(s): BNP in the last 72 hours. ABGs:   Recent Labs     01/04/21  1900 01/05/21  0538   PHART 7.297* 7.355   CVO7OCA 43.3 41.1   PO2ART 92.3 285.0*     INR:   Recent Labs     01/06/21  0518   INR 1.04       U/A:  Recent Labs     01/05/21  0504 01/05/21  0527   COLORU Yellow  --    PHUR 6.0 6.0   WBCUA 0-2  --    RBCUA 0-2  --    CLARITYU Clear  --    SPECGRAV 1.015  --    LEUKOCYTESUR Negative  --    UROBILINOGEN 0.2  --    BILIRUBINUR Negative  --    BLOODU TRACE-LYSED*  --    GLUCOSEU Negative  --        No orders to display       EKG: NSR, with ST depression in inferior leads    ASSESSMENT AND PLAN:     aMry Perez Jr is a 64 y. o. male with PMHx of CVA in 2019, unknown seizure disorder, CAD who presents to [facility] ED for status epilepticus     Status Epilepticus  Had 4-seizure like episodes over a period of several hours with altered mental status  - Head CT unremarkable  - Propofol gtt. Ativan 2mg PRN   - seizure precuations.  NPO  - Neurology consulted, cEEG when patient is no longer restless  - UDS: positive for benzodiazapine and cannabinoids, CK: 2901 , PT/INR  - give thiamine   - IV Keppra 1000mg BID    Rhabdomyolysis 2/2 Status Epilepticus  CK- 2901, trending upwards following seizures on 01/04/2021  - IV NS  -trend CK, Phos, K, Ca, myoglobin, LDH     Acute Kidney Injury  Cr 1-->1.3 in 24 hours, Cr-1.4 currently trending upwards, Patient was hypotensive upon admission  -IV NS + KCl PRN  -trend BUN, Cr    Lactic acidosis  - Lactic acid 14.4--> 1.9, resolving with cessation of seizure activity  -AG- 11 on 01/06/2021  - resolved, D/C Lactate trend     Hypotension  - IVF bolus ordered  - likely 2/2 sedation     Hx CVA  - Hx right MCA distribution about 2 years ago s/p thrombectomy at   - ASA, plavix  - statin     CAD   - ASA, Plavix

## 2021-01-06 NOTE — PROGRESS NOTES
Neurosurgery Progress Note    Patient: Rikki Allen MRN: [id number]   YOB: 1960 Age: 64 y.o. Sex: male   Unit: [address] [place] [address] [facility] Room/Bed: 4522/4522-01 Location: [address] [place] [address]    Admitting Physician: Rosa Schneider    Primary Care Physician: No primary care provider on file. LOS: 1 day     Subjective: The patient was seen and examined. No over night events reported. Labs with elevated CK and Cr this am- no seizure activity reported. On Keppra   Patient denies any new symptoms. Stated he was taking an anti seizure medication but cannot remember what it was called. Absolutely denies any alcohol usage.     BP (!) 146/77   Pulse 72   Temp 98.8 °F (37.1 °C) (Oral)   Resp 16   Ht 5' 6\" (1.676 m)   Wt 134 lb 4.2 oz (60.9 kg)   SpO2 (!) 89%   BMI 21.67 kg/m²    Temp (24hrs), Av.3 °F (37.4 °C), Min:98.8 °F (37.1 °C), Max:100.1 °F (37.8 °C)    Patient Vitals for the past 24 hrs:   BP Temp Temp src Pulse Resp SpO2   21 0800 (!) 146/77 98.8 °F (37.1 °C) Oral 72 16 (!) 89 %   21 0700 134/83 -- -- 73 -- 92 %   21 0600 130/78 -- -- 76 -- 91 %   21 0500 129/87 -- -- 73 -- 92 %   21 0400 (!) 140/80 99 °F (37.2 °C) Oral 81 -- 90 %   21 0300 (!) 129/115 -- -- 80 -- 90 %   21 0200 136/77 -- -- 82 -- 90 %   21 0100 (!) 151/82 -- -- 98 -- (!) 88 %   21 0000 127/74 99.5 °F (37.5 °C) Oral 93 -- 91 %   21 2300 119/75 -- -- 93 -- 93 %   21 2200 127/69 -- -- 82 -- 91 %   21 2100 (!) 152/80 -- -- 96 16 93 %   21 128/89 100.1 °F (37.8 °C) Oral 86 25 94 %   21 1900 115/88 -- -- 87 26 96 %   21 1800 121/85 -- -- 94 23 97 %   21 1700 (!) 128/92 -- -- 88 23 --   21 1600 111/74 99 °F (37.2 °C) Oral 87 27 96 %   21 1500 -- -- -- 108 17 97 %   21 1400 97/67 -- -- 91 22 99 %   21 1323 -- -- -- 95 16 100 %   21 1300 86/64 -- -- 93 18 97 %   21 1230 94/70 99.1 °F (37.3 °C) Axillary 99 19 99 %   01/05/21 1205 -- -- -- 127 26 99 %   01/05/21 1200 (!) 141/83 -- -- 132 26 99 %   01/05/21 1100 113/85 -- -- 99 21 100 %   01/05/21 1000 119/78 -- -- 93 20 98 %   01/05/21 0952 -- -- -- 95 20 100 %   01/05/21 0900 (!) 150/106 99.6 °F (37.6 °C) Oral 113 25 100 %     ICP:   @BLOODFLOW([id number])@    No intake/output data recorded.     Intake/Output Summary (Last 24 hours) at 1/6/2021 0859  Last data filed at 1/6/2021 0600  Gross per 24 hour   Intake 2769.98 ml   Output 3405 ml   Net -635.02 ml     Yesterday:  01/05 0701 - 01/06 0700  In: 3020 [I.V.:2170]  Out: 3405 [Urine:3405]    PHYSICAL EXAM:     4 - Opens eyes on own  5 - Alert and oriented  6 - Follows simple motor commands    Motor: 3/5 in R upper/ RL and LL 1/5 in LUE from previous stroke   Sensory: present in all 4 extremities   Abdomen/Groin: Normal BS  Pulses: present         Data Review  Recent Results (from the past 24 hour(s))   Lactic acid, plasma    Collection Time: 01/05/21  1:55 PM   Result Value Ref Range    Lactic Acid 2.6 (H) 0.4 - 2.0 mmol/L   Lactic acid, plasma    Collection Time: 01/05/21 11:00 PM   Result Value Ref Range    Lactic Acid 1.9 0.4 - 2.0 mmol/L   Basic Metabolic Panel w/ Reflex to MG    Collection Time: 01/06/21  5:18 AM   Result Value Ref Range    Sodium 145 136 - 145 mmol/L    Potassium reflex Magnesium 3.2 (L) 3.5 - 5.1 mmol/L    Chloride 110 99 - 110 mmol/L    CO2 23 21 - 32 mmol/L    Anion Gap 12 3 - 16    Glucose 113 (H) 70 - 99 mg/dL    BUN 10 7 - 20 mg/dL    CREATININE 1.4 (H) 0.9 - 1.3 mg/dL    GFR Non-African American 52 (A) >60    GFR African American >60 >60    Calcium 9.5 8.3 - 10.6 mg/dL   CBC auto differential    Collection Time: 01/06/21  5:18 AM   Result Value Ref Range    WBC 10.8 4.0 - 11.0 K/uL    RBC 4.55 4.20 - 5.90 M/uL    Hemoglobin 13.8 13.5 - 17.5 g/dL    Hematocrit 40.7 40.5 - 52.5 %    MCV 89.3 80.0 - 100.0 fL    MCH 30.3 26.0 - 34.0 pg    MCHC 33.9 31.0 - 36.0 g/dL    RDW 14.0 12.4 - 15.4 %    Platelets 488 178 - 001 K/uL    MPV 9.4 5.0 - 10.5 fL    Neutrophils % 77.8 %    Lymphocytes % 14.3 %    Monocytes % 7.5 %    Eosinophils % 0.0 %    Basophils % 0.4 %    Neutrophils Absolute 8.4 (H) 1.7 - 7.7 K/uL    Lymphocytes Absolute 1.5 1.0 - 5.1 K/uL    Monocytes Absolute 0.8 0.0 - 1.3 K/uL    Eosinophils Absolute 0.0 0.0 - 0.6 K/uL    Basophils Absolute 0.0 0.0 - 0.2 K/uL   Protime-INR    Collection Time: 01/06/21  5:18 AM   Result Value Ref Range    Protime 12.1 10.0 - 13.2 sec    INR 1.04 0.86 - 1.14   CK    Collection Time: 01/06/21  5:18 AM   Result Value Ref Range    Total CK 2,901 (H) 39 - 308 U/L   Magnesium    Collection Time: 01/06/21  5:18 AM   Result Value Ref Range    Magnesium 1.70 (L) 1.80 - 2.40 mg/dL         Scheduled Meds:   magnesium sulfate  2 g Intravenous Once    aspirin  81 mg Oral Daily    clopidogrel  75 mg Oral Daily    metoprolol tartrate  12.5 mg Oral BID    rosuvastatin  10 mg Oral Nightly    sodium chloride flush  10 mL Intravenous 2 times per day    enoxaparin  40 mg Subcutaneous Daily    thiamine  100 mg Intravenous Daily    pantoprazole  40 mg Intravenous Daily    levetiracetam  1,000 mg Intravenous Q12H       Continuous Infusions:   sodium chloride         PRN Meds:  nitroGLYCERIN, sodium chloride flush, promethazine **OR** ondansetron, polyethylene glycol, acetaminophen **OR** acetaminophen, potassium chloride, magnesium sulfate, LORazepam    Imaging Review:  No orders to display       No admission procedures for hospital encounter.     Assessment/Plan:    -Continue Keppra 1000 BID  -Fluids for KINGS in the setting of Rhabdomyolysis  -Replete electrolytes as required  -Q4 neuro checks  -Transfer out of ICU   -Discharge planning per primary team   - Will need to have AED on discharge once able   -Will discuss with patient importance of staying on AED as apparently was taking something   -Passed bedside swallow now on Cardiac diet   -Contiuue ASA, Plavix, statin for CAD, and h/o stroke with R carotid artery stenosis   -PT/OT      Active Problems:    History of CVA (cerebrovascular accident)    Status epilepticus ([address]    Respiratory failure requiring intubation ([facility] [address]  Resolved Problems:    * No resolved hospital problems.  *      This patient will be discussed with Dr Joss Alfredo By: Michelle Samayoa   January 6, 2021

## 2021-01-06 NOTE — PLAN OF CARE
Problem: Skin Integrity:  Goal: Will show no infection signs and symptoms  Description: Will show no infection signs and symptoms  Outcome: Ongoing     Problem: Falls - Risk of:  Goal: Will remain free from falls  Description: Will remain free from falls  Outcome: Ongoing     Problem: Restraint Use - Nonviolent/Non-Self-Destructive Behavior:  Goal: Absence of restraint indications  Description: Absence of restraint indications  Outcome: Completed  Goal: Absence of restraint-related injury  Description: Absence of restraint-related injury  Outcome: Completed

## 2021-01-06 NOTE — PROGRESS NOTES
Temp 98.8 °F (37.1 °C) (Oral)   Resp 19   Ht 5' 6\" (1.676 m)   Wt 134 lb 4.2 oz (60.9 kg)   SpO2 (!) 88%   BMI 21.67 kg/m²   SPO2 (COPD values may differ): Greater than or equal to 92% on room air = 0    Peak Flow (asthma only): not applicable = 0    RSI: 5-6 = Q4hr PRN (every four hours as needed) for dyspnea        Plan       Goals: medication delivery    Patient/caregiver was educated on the proper method of use for Respiratory Care Devices:  Yes      Level of patient/caregiver understanding able to:   ? Verbalize understanding   ? Demonstrate understanding       ? Teach back        ? Needs reinforcement       ? No available caregiver               ? Other:     Response to education:  Good     Is patient being placed on Home Treatment Regimen? Yes     Does the patient have everything they need prior to discharge? Yes     Comments:     Plan of Care: Albuterol HHN PRN-as is pt's home regimen as well. Electronically signed by Abdoulaye Cooper RCP on 1/6/2021 at 12:28 PM    Respiratory Protocol Guidelines     1. Assessment and treatment by Respiratory Therapy will be initiated for medication and therapeutic interventions upon initiation of aerosolized medication. 2. Physician will be contacted for respiratory rate (RR) greater than 35 breaths per minute. Therapy will be held for heart rate (HR) greater than 140 beats per minute, pending direction from physician. 3. Bronchodilators will be administered via Metered Dose Inhaler (MDI) with spacer when the following criteria are met:  a. Alert and cooperative     b. HR < 140 bpm  c. RR < 30 bpm                d. Can demonstrate a 2-3 second inspiratory hold  4. Bronchodilators will be administered via Hand Held Nebulizer MAGUE [facility]) to patients when ANY of the following criteria are met  a. Incognizant or uncooperative          b. Patients treated with HHN at Home        c. Unable to demonstrate proper use of MDI with spacer     d. RR > 30 bpm   5.  Bronchodilators will be delivered via Metered Dose Inhaler (MDI), HHN, Aerogen to intubated patients on mechanical ventilation. 6. Inhalation medication orders will be delivered and/or substituted as outlined below. Aerosolized Medications Ordering and Administration Guidelines:    1. All Medications will be ordered by a physician, and their frequency and/or modality will be adjusted as defined by the patients Respiratory Severity Index (RSI) score. 2. If the patient does not have documented COPD, consider discontinuing anticholinergics when RSI is less than 9.  3. If the bronchospasm worsens (increased RSI), then the bronchodilator frequency can be increased to a maximum of every 4 hours. If greater than every 4 hours is required, the physician will be contacted. 4. If the bronchospasm improves, the frequency of the bronchodilator can be decreased, based on the patient's RSI, but not less than home treatment regimen frequency. 5. Bronchodilator(s) will be discontinued if patient has a RSI less than 9 and has received no scheduled or as needed treatment for 72  Hrs. Patients Ordered on a Mucolytic Agent:    1. Must always be administered with a bronchodilator. 2. Discontinue if patient experiences worsened bronchospasm, or secretions have lessened to the point that the patient is able to clear them with a cough. Anti-inflammatory and Combination Medications:    1. If the patient lacks prior history of lung disease, is not using inhaled anti-inflammatory medication at home, and lacks wheezing by examination or by history for at least 24 hours, contact physician for possible discontinuation.

## 2021-01-07 LAB
ALBUMIN SERPL-MCNC: 3.1 G/DL (ref 3.4–5)
ANION GAP SERPL CALCULATED.3IONS-SCNC: 8 MMOL/L (ref 3–16)
BASOPHILS ABSOLUTE: 0 K/UL (ref 0–0.2)
BASOPHILS RELATIVE PERCENT: 0.2 %
BUN BLDV-MCNC: 10 MG/DL (ref 7–20)
CALCIUM SERPL-MCNC: 9.3 MG/DL (ref 8.3–10.6)
CHLORIDE BLD-SCNC: 106 MMOL/L (ref 99–110)
CO2: 28 MMOL/L (ref 21–32)
CREAT SERPL-MCNC: 1.1 MG/DL (ref 0.9–1.3)
EOSINOPHILS ABSOLUTE: 0.1 K/UL (ref 0–0.6)
EOSINOPHILS RELATIVE PERCENT: 1 %
GFR AFRICAN AMERICAN: >60
GFR NON-AFRICAN AMERICAN: >60
GLUCOSE BLD-MCNC: 99 MG/DL (ref 70–99)
HCT VFR BLD CALC: 36.1 % (ref 40.5–52.5)
HEMOGLOBIN: 12.6 G/DL (ref 13.5–17.5)
LYMPHOCYTES ABSOLUTE: 1.5 K/UL (ref 1–5.1)
LYMPHOCYTES RELATIVE PERCENT: 16.5 %
MAGNESIUM: 1.6 MG/DL (ref 1.8–2.4)
MCH RBC QN AUTO: 30.7 PG (ref 26–34)
MCHC RBC AUTO-ENTMCNC: 35 G/DL (ref 31–36)
MCV RBC AUTO: 87.7 FL (ref 80–100)
MONOCYTES ABSOLUTE: 0.6 K/UL (ref 0–1.3)
MONOCYTES RELATIVE PERCENT: 7.2 %
NEUTROPHILS ABSOLUTE: 6.8 K/UL (ref 1.7–7.7)
NEUTROPHILS RELATIVE PERCENT: 75.1 %
PDW BLD-RTO: 13.8 % (ref 12.4–15.4)
PHOSPHORUS: 2.5 MG/DL (ref 2.5–4.9)
PLATELET # BLD: 145 K/UL (ref 135–450)
PMV BLD AUTO: 9.3 FL (ref 5–10.5)
POTASSIUM SERPL-SCNC: 2.9 MMOL/L (ref 3.5–5.1)
RBC # BLD: 4.11 M/UL (ref 4.2–5.9)
SODIUM BLD-SCNC: 142 MMOL/L (ref 136–145)
TOTAL CK: 6182 U/L (ref 39–308)
WBC # BLD: 9 K/UL (ref 4–11)

## 2021-01-07 PROCEDURE — 2500000003 HC RX 250 WO HCPCS: Performed by: STUDENT IN AN ORGANIZED HEALTH CARE EDUCATION/TRAINING PROGRAM

## 2021-01-07 PROCEDURE — 6370000000 HC RX 637 (ALT 250 FOR IP): Performed by: PHYSICIAN ASSISTANT

## 2021-01-07 PROCEDURE — 6370000000 HC RX 637 (ALT 250 FOR IP): Performed by: INTERNAL MEDICINE

## 2021-01-07 PROCEDURE — 6370000000 HC RX 637 (ALT 250 FOR IP): Performed by: STUDENT IN AN ORGANIZED HEALTH CARE EDUCATION/TRAINING PROGRAM

## 2021-01-07 PROCEDURE — 82550 ASSAY OF CK (CPK): CPT

## 2021-01-07 PROCEDURE — 83735 ASSAY OF MAGNESIUM: CPT

## 2021-01-07 PROCEDURE — 36415 COLL VENOUS BLD VENIPUNCTURE: CPT

## 2021-01-07 PROCEDURE — 6360000002 HC RX W HCPCS: Performed by: STUDENT IN AN ORGANIZED HEALTH CARE EDUCATION/TRAINING PROGRAM

## 2021-01-07 PROCEDURE — 97535 SELF CARE MNGMENT TRAINING: CPT

## 2021-01-07 PROCEDURE — 97530 THERAPEUTIC ACTIVITIES: CPT

## 2021-01-07 PROCEDURE — 6360000002 HC RX W HCPCS: Performed by: INTERNAL MEDICINE

## 2021-01-07 PROCEDURE — 1200000000 HC SEMI PRIVATE

## 2021-01-07 PROCEDURE — 85025 COMPLETE CBC W/AUTO DIFF WBC: CPT

## 2021-01-07 PROCEDURE — 2580000003 HC RX 258: Performed by: STUDENT IN AN ORGANIZED HEALTH CARE EDUCATION/TRAINING PROGRAM

## 2021-01-07 PROCEDURE — 80069 RENAL FUNCTION PANEL: CPT

## 2021-01-07 PROCEDURE — 6360000002 HC RX W HCPCS: Performed by: PHYSICIAN ASSISTANT

## 2021-01-07 RX ORDER — MAGNESIUM SULFATE IN WATER 40 MG/ML
4 INJECTION, SOLUTION INTRAVENOUS ONCE
Status: COMPLETED | OUTPATIENT
Start: 2021-01-07 | End: 2021-01-07

## 2021-01-07 RX ADMIN — METOPROLOL TARTRATE 12.5 MG: 25 TABLET, FILM COATED ORAL at 08:39

## 2021-01-07 RX ADMIN — ROSUVASTATIN CALCIUM 10 MG: 5 TABLET, COATED ORAL at 19:57

## 2021-01-07 RX ADMIN — POTASSIUM CHLORIDE 10 MEQ: 7.46 INJECTION, SOLUTION INTRAVENOUS at 10:44

## 2021-01-07 RX ADMIN — SODIUM BICARBONATE: 84 INJECTION, SOLUTION INTRAVENOUS at 17:34

## 2021-01-07 RX ADMIN — LEVETIRACETAM 1000 MG: 500 TABLET ORAL at 08:39

## 2021-01-07 RX ADMIN — POTASSIUM CHLORIDE 10 MEQ: 7.46 INJECTION, SOLUTION INTRAVENOUS at 08:39

## 2021-01-07 RX ADMIN — METOPROLOL TARTRATE 25 MG: 25 TABLET, FILM COATED ORAL at 19:57

## 2021-01-07 RX ADMIN — POTASSIUM CHLORIDE 10 MEQ: 7.46 INJECTION, SOLUTION INTRAVENOUS at 06:47

## 2021-01-07 RX ADMIN — LEVETIRACETAM 1000 MG: 500 TABLET ORAL at 19:57

## 2021-01-07 RX ADMIN — Medication 100 MG: at 08:40

## 2021-01-07 RX ADMIN — MAGNESIUM SULFATE HEPTAHYDRATE 4 G: 40 INJECTION, SOLUTION INTRAVENOUS at 19:07

## 2021-01-07 RX ADMIN — SODIUM BICARBONATE: 84 INJECTION, SOLUTION INTRAVENOUS at 08:39

## 2021-01-07 RX ADMIN — CLOPIDOGREL BISULFATE 75 MG: 75 TABLET, FILM COATED ORAL at 08:39

## 2021-01-07 RX ADMIN — ASPIRIN 81 MG: 81 TABLET, CHEWABLE ORAL at 08:39

## 2021-01-07 RX ADMIN — HEPARIN SODIUM 5000 UNITS: 5000 INJECTION INTRAVENOUS; SUBCUTANEOUS at 21:35

## 2021-01-07 ASSESSMENT — PAIN SCALES - GENERAL
PAINLEVEL_OUTOF10: 0

## 2021-01-07 NOTE — PROGRESS NOTES
No acute events throughout shift. Patient has remained seizure free since admission. A/Ox4. Neurologically intact. L-sided weakness d/t old CVA. Patient voiding, eating, and tolerating fluids. Currently expressing he is agreeable to an EEG if still needed. Dr. Kenney notified of patient's elevated BPs--awaiting orders. Report called to [address] [place] [employer]. Patient will transfer into room 5527.

## 2021-01-07 NOTE — PROGRESS NOTES
Pt has arrived to room 5527. A&O, VSS. Denies pain and n/v at this time. Up x1 with a cane. Using the urinal to void. All fall precautions in place. Will continue to monitor.

## 2021-01-07 NOTE — PROGRESS NOTES
Occupational Therapy  Facility/Department: [facility] ICU  Daily Treatment Note  NAME: Zoraida Marroquin  : 1960  MRN: [id number]    Date of Service: 2021    Discharge Recommendations: Zoraida Marroquin scored a 21/24 on the AM-PAC ADL Inpatient form. Current research shows that an AM-PAC score of 18 or greater is typically associated with a discharge to the patient's home setting. Based on the patient's AM-PAC score, and their current ADL deficits, it is recommended that the patient have 2-3 sessions per week of Occupational Therapy at d/c to increase the patient's independence. At this time, this patient demonstrates the endurance and safety to discharge home with home services and a follow up treatment frequency of 2-3x/wk. Please see assessment section for further patient specific details. If patient discharges prior to next session this note will serve as a discharge summary. Please see below for the latest assessment towards goals. S Level 1       Assessment   Performance deficits / Impairments: Decreased functional mobility ; Decreased ADL status; Decreased endurance  Assessment: Pt continuing to make progress with increased steadiness on feet and using compensatory techniques for ADLs. Pt plans for d/c home with roommate to assist and denied need for additional home therapies. Therapist provided education on benefit from home safety eval and UE strengthening. Will cont to follow on POC. Treatment Diagnosis: impaired ADLs /functional transfers 2/2 seizures  Prognosis: Fair  Decision Making: Medium Complexity  OT Education: OT Role;Plan of Care;Transfer Training  Patient Education: discussed d/c planning- pt verbalized undestanding  Activity Tolerance  Activity Tolerance: Patient Tolerated treatment well  Safety Devices  Safety Devices in place: Yes  Type of devices: Call light within reach; Left in chair;Chair alarm in place;Nurse notified         Patient Diagnosis(es): There were no encounter Comment: impuslive this date and required VC to slow down or pay attention to lines                                         Plan   Plan  Times per week: 2-5x  Times per day: Daily  Current Treatment Recommendations: Endurance Training, Safety Education & Training, Self-Care / ADL, Equipment Evaluation, Education, & procurement, Patient/Caregiver Education & Training, Functional Mobility Training  G-Code     OutComes Score                                                  AM-PAC Score        AM-PAC Inpatient Daily Activity Raw Score: 21 (01/07/21 1552)  AM-PAC Inpatient ADL T-Scale Score : 44.27 (01/07/21 1552)  ADL Inpatient CMS 0-100% Score: 32.79 (01/07/21 1552)  ADL Inpatient CMS G-Code Modifier : Latoya Cash (01/07/21 1552)    Goals  Short term goals  Time Frame for Short term goals: at d/c  Short term goal 1: Stance x 7 mins with Supervision for ADLs- ongoing  Short term goal 2: LE Dressing with Mod independence- ongoing  Short term goal 3: Commode transfers with Mod independence- ongoing  Patient Goals   Patient goals : Go home ASAP       Therapy Time   Individual Concurrent Group Co-treatment   Time In [address]         Time Out 1525         Minutes 31         Timed Code Treatment Minutes: [address], OT

## 2021-01-07 NOTE — PROGRESS NOTES
4 Eyes Admission Assessment     I agree as the admission nurse that 2 RN's have performed a thorough Head to Toe Skin Assessment on the patient. ALL assessment sites listed below have been assessed on admission. Areas assessed by both nurses:   [x]   Head, Face, and Ears   [x]   Shoulders, Back, and Chest  [x]   Arms, Elbows, and Hands   [x]   Coccyx, Sacrum, and Ischium  [x]   Legs, Feet, and Heels        Does the Patient have Skin Breakdown?   No         Chandan Prevention initiated:  No   Wound Care Orders initiated:  No      [facility] nurse consulted for Pressure Injury (Stage 3,4, Unstageable, DTI, NWPT, and Complex wounds) or Chandan score 18 or lower:  No      Nurse 1 eSignature: Electronically signed by Irving Landau, RN on 1/7/21 at 6:19 PM EST    **SHARE this note so that the co-signing nurse is able to place an eSignature**    Nurse 2 eSignature: Electronically signed by Horald Dandy, RN on 1/7/21 at 6:47 PM EST

## 2021-01-07 NOTE — PROGRESS NOTES
Pt refuses anticoagulants as well as SCD's. RN educated pt on importance of medication and prevention and pt continues to refuse. Will continue to educate and monitor.

## 2021-01-07 NOTE — PLAN OF CARE
Problem: Pain:  Goal: Pain level will decrease  Description: Pain level will decrease  Outcome: Ongoing  Pt denies any pain at this time. Problem: Falls - Risk of:  Goal: Will remain free from falls  Description: Will remain free from falls  Outcome: Ongoing  Fall precautions in place. Bed is in lowest position, wheels locked, bed alarm on, non skid socks on. Call light and bedside table within reach. Pt calls out appropriately. Pt is up x1 with a cane and gb. Will continue to assess and monitor.

## 2021-01-07 NOTE — PROGRESS NOTES
Physician Progress Note      PATIENT:               Cuauhtemoc Jewell  CSN #:                  [id number]  :                       1960  ADMIT DATE:       2021 4:46 AM  DISCH DATE:  RESPONDING  PROVIDER #:        Crystal Sarmiento DO          QUERY TEXT:    Dear Attending Physician,    Pt admitted with seizure. Pt noted to have hx of CVA in 2019. If possible,   please document in progress notes and discharge summary if you are evaluating   and/or treating any of the following: The medical record reflects the following:  Risk Factors: CVA, alcohol use  Clinical Indicators: Per OSH ED \"cannot find any history of seizures , no   seizure medications listed in his care everywhere\", Per H&P \"Hx right MCA   distribution about 2 years ago, PMHx seizure disorder; ETOH:  reports current   alcohol use\"; Tox screen + Benzos/ Cannabis  Treatment: Keppra, Thiamine injection  Options provided:  -- Seizure as a sequela of prior CVA  -- Seizure due to alcohol withdrawal  -- Seizure due to other, Please specify. -- Other - I will add my own diagnosis  -- Disagree - Not applicable / Not valid  -- Disagree - Clinically unable to determine / Unknown  -- Refer to Clinical Documentation Reviewer    PROVIDER RESPONSE TEXT:    Seizure is a sequela of prior CVA. Query created by: Xuan Marroquin on 2021 9:44 AM      QUERY TEXT:    Pt admitted with status epilepticus and intubated, and has \"VDRF\" documented   in H&P addendum. If possible, please document in progress notes and discharge   summary further specificity VDRF:    The medical record reflects the following:  Risk Factors: Status  epilepticus  Clinical Indicators: Per H&P addendum \"VDRF\", ABG: PH: 7.11, pCO2: 51.7, pO2:   174 (after intubation);  Per OSH ED \"was unresponsive with labored respirations   on arrival, Exam- moderately dyspneic, respirations labored\"  Treatment: Intubation/ mechanical ventilation:  Options provided:  -- Acute respiratory failure POA -- Intubated for airway protection without acute respiratory failure  -- Other - I will add my own diagnosis  -- Disagree - Not applicable / Not valid  -- Disagree - Clinically unable to determine / Unknown  -- Refer to Clinical Documentation Reviewer    PROVIDER RESPONSE TEXT:    This patient was Intubated for airway protection without acute respiratory   failure. Query created by:  Tia Atkinson on 1/5/2021 9:53 AM      Electronically signed by:  Gabi Talbot DO 1/7/2021 8:14 AM

## 2021-01-08 LAB
ALBUMIN SERPL-MCNC: 3.1 G/DL (ref 3.4–5)
ANION GAP SERPL CALCULATED.3IONS-SCNC: 10 MMOL/L (ref 3–16)
BASOPHILS ABSOLUTE: 0 K/UL (ref 0–0.2)
BASOPHILS RELATIVE PERCENT: 0.4 %
BUN BLDV-MCNC: 10 MG/DL (ref 7–20)
CALCIUM SERPL-MCNC: 9.2 MG/DL (ref 8.3–10.6)
CHLORIDE BLD-SCNC: 100 MMOL/L (ref 99–110)
CO2: 31 MMOL/L (ref 21–32)
CREAT SERPL-MCNC: 1 MG/DL (ref 0.9–1.3)
EOSINOPHILS ABSOLUTE: 0.2 K/UL (ref 0–0.6)
EOSINOPHILS RELATIVE PERCENT: 3.4 %
GFR AFRICAN AMERICAN: >60
GFR NON-AFRICAN AMERICAN: >60
GLUCOSE BLD-MCNC: 92 MG/DL (ref 70–99)
HCT VFR BLD CALC: 37.3 % (ref 40.5–52.5)
HEMOGLOBIN: 12.8 G/DL (ref 13.5–17.5)
LYMPHOCYTES ABSOLUTE: 1.6 K/UL (ref 1–5.1)
LYMPHOCYTES RELATIVE PERCENT: 23.8 %
MAGNESIUM: 1.7 MG/DL (ref 1.8–2.4)
MAGNESIUM: 2 MG/DL (ref 1.8–2.4)
MCH RBC QN AUTO: 30.4 PG (ref 26–34)
MCHC RBC AUTO-ENTMCNC: 34.2 G/DL (ref 31–36)
MCV RBC AUTO: 88.8 FL (ref 80–100)
MONOCYTES ABSOLUTE: 0.5 K/UL (ref 0–1.3)
MONOCYTES RELATIVE PERCENT: 8 %
NEUTROPHILS ABSOLUTE: 4.3 K/UL (ref 1.7–7.7)
NEUTROPHILS RELATIVE PERCENT: 64.4 %
PDW BLD-RTO: 13.6 % (ref 12.4–15.4)
PHOSPHORUS: 3.4 MG/DL (ref 2.5–4.9)
PLATELET # BLD: 153 K/UL (ref 135–450)
PMV BLD AUTO: 8.9 FL (ref 5–10.5)
POTASSIUM REFLEX MAGNESIUM: 3 MMOL/L (ref 3.5–5.1)
POTASSIUM SERPL-SCNC: 2.7 MMOL/L (ref 3.5–5.1)
RBC # BLD: 4.2 M/UL (ref 4.2–5.9)
SODIUM BLD-SCNC: 141 MMOL/L (ref 136–145)
TOTAL CK: 5373 U/L (ref 39–308)
WBC # BLD: 6.7 K/UL (ref 4–11)

## 2021-01-08 PROCEDURE — 2580000003 HC RX 258: Performed by: PHYSICIAN ASSISTANT

## 2021-01-08 PROCEDURE — 6360000002 HC RX W HCPCS: Performed by: STUDENT IN AN ORGANIZED HEALTH CARE EDUCATION/TRAINING PROGRAM

## 2021-01-08 PROCEDURE — 36415 COLL VENOUS BLD VENIPUNCTURE: CPT

## 2021-01-08 PROCEDURE — 6360000002 HC RX W HCPCS: Performed by: INTERNAL MEDICINE

## 2021-01-08 PROCEDURE — 97116 GAIT TRAINING THERAPY: CPT

## 2021-01-08 PROCEDURE — 2580000003 HC RX 258: Performed by: STUDENT IN AN ORGANIZED HEALTH CARE EDUCATION/TRAINING PROGRAM

## 2021-01-08 PROCEDURE — 1200000000 HC SEMI PRIVATE

## 2021-01-08 PROCEDURE — 80069 RENAL FUNCTION PANEL: CPT

## 2021-01-08 PROCEDURE — 97110 THERAPEUTIC EXERCISES: CPT

## 2021-01-08 PROCEDURE — 6370000000 HC RX 637 (ALT 250 FOR IP): Performed by: INTERNAL MEDICINE

## 2021-01-08 PROCEDURE — 97530 THERAPEUTIC ACTIVITIES: CPT

## 2021-01-08 PROCEDURE — 84132 ASSAY OF SERUM POTASSIUM: CPT

## 2021-01-08 PROCEDURE — 97535 SELF CARE MNGMENT TRAINING: CPT

## 2021-01-08 PROCEDURE — 6370000000 HC RX 637 (ALT 250 FOR IP): Performed by: STUDENT IN AN ORGANIZED HEALTH CARE EDUCATION/TRAINING PROGRAM

## 2021-01-08 PROCEDURE — 83735 ASSAY OF MAGNESIUM: CPT

## 2021-01-08 PROCEDURE — 82550 ASSAY OF CK (CPK): CPT

## 2021-01-08 PROCEDURE — 6370000000 HC RX 637 (ALT 250 FOR IP): Performed by: PHYSICIAN ASSISTANT

## 2021-01-08 PROCEDURE — 2500000003 HC RX 250 WO HCPCS: Performed by: STUDENT IN AN ORGANIZED HEALTH CARE EDUCATION/TRAINING PROGRAM

## 2021-01-08 PROCEDURE — 85025 COMPLETE CBC W/AUTO DIFF WBC: CPT

## 2021-01-08 PROCEDURE — 6360000002 HC RX W HCPCS: Performed by: PHYSICIAN ASSISTANT

## 2021-01-08 RX ORDER — POTASSIUM CHLORIDE 7.45 MG/ML
10 INJECTION INTRAVENOUS PRN
Status: DISCONTINUED | OUTPATIENT
Start: 2021-01-08 | End: 2021-01-09

## 2021-01-08 RX ORDER — POTASSIUM CHLORIDE 20 MEQ/1
40 TABLET, EXTENDED RELEASE ORAL PRN
Status: DISCONTINUED | OUTPATIENT
Start: 2021-01-08 | End: 2021-01-09

## 2021-01-08 RX ADMIN — ROSUVASTATIN CALCIUM 10 MG: 5 TABLET, COATED ORAL at 21:11

## 2021-01-08 RX ADMIN — HEPARIN SODIUM 5000 UNITS: 5000 INJECTION INTRAVENOUS; SUBCUTANEOUS at 06:32

## 2021-01-08 RX ADMIN — SODIUM BICARBONATE: 84 INJECTION, SOLUTION INTRAVENOUS at 05:59

## 2021-01-08 RX ADMIN — Medication 100 MG: at 09:13

## 2021-01-08 RX ADMIN — LEVETIRACETAM 1000 MG: 500 TABLET ORAL at 21:11

## 2021-01-08 RX ADMIN — POTASSIUM BICARBONATE 40 MEQ: 782 TABLET, EFFERVESCENT ORAL at 13:28

## 2021-01-08 RX ADMIN — POTASSIUM CHLORIDE 10 MEQ: 7.46 INJECTION, SOLUTION INTRAVENOUS at 09:09

## 2021-01-08 RX ADMIN — Medication 10 ML: at 21:16

## 2021-01-08 RX ADMIN — POTASSIUM CHLORIDE 10 MEQ: 7.46 INJECTION, SOLUTION INTRAVENOUS at 07:41

## 2021-01-08 RX ADMIN — METOPROLOL TARTRATE 25 MG: 25 TABLET, FILM COATED ORAL at 09:10

## 2021-01-08 RX ADMIN — POTASSIUM CHLORIDE 10 MEQ: 7.46 INJECTION, SOLUTION INTRAVENOUS at 11:17

## 2021-01-08 RX ADMIN — LEVETIRACETAM 1000 MG: 500 TABLET ORAL at 09:10

## 2021-01-08 RX ADMIN — METOPROLOL TARTRATE 25 MG: 25 TABLET, FILM COATED ORAL at 21:11

## 2021-01-08 RX ADMIN — POTASSIUM CHLORIDE AND SODIUM CHLORIDE: 900; 150 INJECTION, SOLUTION INTRAVENOUS at 22:46

## 2021-01-08 RX ADMIN — HEPARIN SODIUM 5000 UNITS: 5000 INJECTION INTRAVENOUS; SUBCUTANEOUS at 21:10

## 2021-01-08 RX ADMIN — POTASSIUM CHLORIDE AND SODIUM CHLORIDE: 900; 150 INJECTION, SOLUTION INTRAVENOUS at 13:28

## 2021-01-08 RX ADMIN — CLOPIDOGREL BISULFATE 75 MG: 75 TABLET, FILM COATED ORAL at 09:10

## 2021-01-08 RX ADMIN — HEPARIN SODIUM 5000 UNITS: 5000 INJECTION INTRAVENOUS; SUBCUTANEOUS at 14:08

## 2021-01-08 RX ADMIN — POTASSIUM BICARBONATE 40 MEQ: 782 TABLET, EFFERVESCENT ORAL at 21:21

## 2021-01-08 RX ADMIN — ASPIRIN 81 MG: 81 TABLET, CHEWABLE ORAL at 09:10

## 2021-01-08 ASSESSMENT — PAIN SCALES - GENERAL: PAINLEVEL_OUTOF10: 0

## 2021-01-08 NOTE — PLAN OF CARE
Problem: Pain:  Description: Pain management should include both nonpharmacologic and pharmacologic interventions. Goal: Pain level will decrease  1/8/2021 0142 by Benny Martinez RN  Outcome: Ongoing  Note: Pt denies pain at this time, VSS, no objective signs of pain. Will continue to monitor. Problem: Falls - Risk of:  Goal: Will remain free from falls  1/8/2021 0142 by Benny Martinez RN  Outcome: Ongoing  Note: Patient is a high fall risk. All fall precautions in place: bed alarm on, nonskid socks on pt, call light within reach, bed locked in lowest position. Will continue to monitor pt safety.

## 2021-01-08 NOTE — PROGRESS NOTES
Hospitalist Progress Note      PCP: No primary care provider on file. Date of Admission: 1/5/2021    Chief Complaint: Seizure    Subjective: Muscle aches are better, no BM yet but he doesn't want laxative    Medications:  Reviewed    Infusion Medications    sodium bicarbonate infusion 150 mL/hr at 01/08/21 0559     Scheduled Medications    metoprolol tartrate  25 mg Oral BID    levETIRAcetam  1,000 mg Oral BID    thiamine  100 mg Oral Daily    heparin (porcine)  5,000 Units Subcutaneous 3 times per day    aspirin  81 mg Oral Daily    clopidogrel  75 mg Oral Daily    rosuvastatin  10 mg Oral Nightly    sodium chloride flush  10 mL Intravenous 2 times per day     PRN Meds: albuterol, nitroGLYCERIN, sodium chloride flush, promethazine **OR** ondansetron, polyethylene glycol, acetaminophen **OR** acetaminophen, potassium chloride, magnesium sulfate, LORazepam      Intake/Output Summary (Last 24 hours) at 1/8/2021 1242  Last data filed at 1/8/2021 1120  Gross per 24 hour   Intake 480 ml   Output 1900 ml   Net -1420 ml       Exam:    BP (!) 149/86   Pulse 70   Temp 98 °F (36.7 °C) (Oral)   Resp 16   Ht 5' 6\" (1.676 m)   Wt 134 lb 4.2 oz (60.9 kg)   SpO2 94%   BMI 21.67 kg/m²     General appearance: No apparent distress, appears stated age and cooperative. HEENT: Pupils equal, round, and reactive to light. Conjunctivae/corneas clear. Neck: Supple, with full range of motion. No jugular venous distention. Trachea midline. Respiratory:  Normal respiratory effort. Clear to auscultation, bilaterally without Rales/Wheezes/Rhonchi. Cardiovascular: Regular rate and rhythm with normal S1/S2 without murmurs, rubs or gallops. Abdomen: Soft, non-tender, non-distended with normal bowel sounds. Musculoskelatal: No clubbing, cyanosis or edema bilaterally. Skin: Skin color, texture, turgor normal.  No rashes or lesions. Neurologic:  Wakes to voice, speaks few words, independently moving all extremities. Labs:   Recent Labs     01/06/21  0518 01/07/21  0508 01/08/21  0607   WBC 10.8 9.0 6.7   HGB 13.8 12.6* 12.8*   HCT 40.7 36.1* 37.3*    145 153     Recent Labs     01/06/21  0516 01/06/21  0518 01/07/21  0508 01/08/21  0607   NA  --  145 142 141   K  --  3.2* 2.9* 2.7*   CL  --  110 106 100   CO2  --  23 28 31   BUN  --  10 10 10   CREATININE  --  1.4* 1.1 1.0   CALCIUM  --  9.5 9.3 9.2   PHOS 2.2*  --  2.5 3.4     No results for input(s): AST, ALT, BILIDIR, BILITOT, ALKPHOS in the last 72 hours. Recent Labs     01/06/21  0518   INR 1.04     Recent Labs     01/06/21  0518 01/07/21  0508 01/08/21  0607   CKTOTAL 2,901* 6,182* 5,373*       Studies:  No orders to display       Assessment/Plan:    Active Hospital Problems    Diagnosis Date Noted    Mucopurulent chronic bronchitis ([facility] [address] [J41.1] 01/06/2021    Status epilepticus ([facility] [address] [G40.901] 01/05/2021    Respiratory failure requiring intubation ([facility] [address] [J96.90] 01/05/2021    History of CVA (cerebrovascular accident) [Z86.73]    Lactic acidosis likely due to above. History of CVA   Mild Hypotension, resolved  CAD  KINGS likely due to hypotension, rhabdo  COPD, chronic not in exacerbation    Neurology consulted  Status resolved, pt extubated 1/5, passed SLP eval today  Keppra dose 1 g BID  On IV fluids for rhabdo, check CK in am  Continue aspirin and plavix, statin   On thiamine  Seizure precautions.      Rhabdomyolysis, improving  IV fluids with bicarb yesterday  Switch to normal saline with potassium  mal saline with Monitor electrolytes    Hypokalemia  Monitor and replete  Mg ok today     KINGS  Resolving with fluids    DVT Prophylaxis: Lovenox  Diet: DIET CARDIAC;  Code Status: Full Code    PT/OT Eval Status: pending    Dispo - Inpatient     Nelida Kyler DO

## 2021-01-08 NOTE — PROGRESS NOTES
Physical Therapy  Facility/Department: [facility] 5T ORTHO/NEURO  Daily Treatment Note  NAME: Gino Paula  : 1960  MRN: [id number]    Date of Service: 2021    Discharge Recommendations:Winston Wagoner scored a 20/24 on the AM-PAC short mobility form. Current research shows that an AM-PAC score of 18 or greater is typically associated with a discharge to the patient's home setting. Based on the patient's AM-PAC score and their current functional mobility deficits, it is recommended that the patient have 2-3 sessions per week of Physical Therapy at d/c to increase the patient's independence. At this time, this patient demonstrates the endurance and safety to discharge home with  (home vs OP services) and a follow up treatment frequency of 2-3x/wk. Please see assessment section for further patient specific details. If patient discharges prior to next session this note will serve as a discharge summary. Please see below for the latest assessment towards goals. PT Equipment Recommendations  Equipment Needed: No    Assessment   Body structures, Functions, Activity limitations: Decreased functional mobility   Assessment: Pt tolerated fair. Pt increased functinl mobility to mod I for bed mobility and supervision for transfers. Pt making progress toward goals and goals update to reflect pt progress Pt requries continued skilled PT in order to increase functional mobiltiy and maximize rehab potential  Treatment Diagnosis: impaired gait and transfers  Prognosis: Good  Decision Making: Low Complexity  PT Education: Goals; Functional Mobility Training;Gait Training;General Safety;Equipment;Transfer Training;Home Exercise Program  Patient Education: pt demonstrates understanding  REQUIRES PT FOLLOW UP: Yes  Activity Tolerance  Activity Tolerance: Patient Tolerated treatment well;Patient limited by endurance     Patient Diagnosis(es): There were no encounter diagnoses.      has a past medical history of CAD (coronary artery disease), CVA (cerebral vascular accident) ([facility] [address] and Nicotine addiction. has a past surgical history that includes Cardiac catheterization (2010). Restrictions  Position Activity Restriction  Other position/activity restrictions: up with assist  Subjective   General  Chart Reviewed: Yes  Additional Pertinent Hx: Admit 1/4 to OSH with seizures, intubated and transferred to [place] [address]; head CT: (-); PMHx: CAD, CVA  Referring Practitioner: DO Kyler  Subjective  Subjective: Pt found supine in bed. Agreeable to PT . Pt reports no pain at this time  Pain Screening  Patient Currently in Pain: Denies  Vital Signs  Patient Currently in Pain: Denies       Orientation  Orientation  Overall Orientation Status: Within Functional Limits  Cognition      Objective   Bed mobility  Supine to Sit: Modified independent  Comment: HOB slightly elevated  Transfers  Sit to Stand: Supervision(Pt sit <> stand with supervision and quad cane , Cues for hand placement)  Stand to sit: Supervision  Ambulation  Ambulation?: Yes  More Ambulation?: No  Ambulation 1  Device: Large [place]  Assistance: Stand by assistance  Quality of Gait: slow and effortful , decreased step length and height, reciprocal pattern , decreased WB LLE at time  Distance: 30'  Stairs/Curb  Stairs?: No(pt refused to attempt steps. Per pt he has ramp at home to get into home. Pt educated on how to properly ascend and descend steps .  Pt verbalized understanding)        Exercises  Hip Flexion: x20 BLE  Hip Abduction: x20 BLE  Knee Long Arc Quad: x20 BLE  Ankle Pumps: x20 BLE  ( AAROM LLE)  Comments: Cues for form and to continue as HEP                        G-Code     OutComes Score                                                     AM-PAC Score             Goals  Short term goals  Time Frame for Short term goals: discharge  Short term goal 1: Pt will transfer supine <--> sit with supervision-- goal met 1/8  Short term goal 2: Pt will transfer sit <--> stand with supervision-- goal met 1/8  Short term goal 3: pt will amb 40 ft with QC and SBA  Short term goal 4: new goal : Pt will ambulate 100 ft with QC and supervision  Short term goal 5: new goal : Pt will tolerate stair assessment  Patient Goals   Patient goals : return home    Plan    Plan  Times per week: 2-5  Current Treatment Recommendations: Strengthening, Balance Training, Functional Mobility Training, Transfer Training, Gait Training, Patient/Caregiver Education & Training  Safety Devices  Type of devices: Chair alarm in place, Gait belt, Nurse notified, Call light within reach, Left in chair     Therapy Time   Individual Concurrent Group Co-treatment   Time In 0952         Time Out 1015         Minutes 23                  Timed Code Treatment Minutes:  23    Total Treatment Minutes:  Mai 425, PT

## 2021-01-08 NOTE — PROGRESS NOTES
Occupational Therapy  Facility/Department: [facility] 5T ORTHO/NEURO  Daily Treatment Note  NAME: Judith Stern  : 1960  MRN: [id number]    Date of Service: 2021    Discharge Recommendations: Judith Stern scored a 21/24 on the AM-PAC ADL Inpatient form. Current research shows that an AM-PAC score of 18 or greater is typically associated with a discharge to the patient's home setting. Based on the patient's AM-PAC score, and their current ADL deficits, it is recommended that the patient have 2-3 sessions per week of Occupational Therapy at d/c to increase the patient's independence. At this time, this patient demonstrates the endurance and safety to discharge home with (home vs OP services) and a follow up treatment frequency of 2-3x/wk. Please see assessment section for further patient specific details. If patient discharges prior to next session this note will serve as a discharge summary. Please see below for the latest assessment towards goals. S Level 1, Home with Home health OT       Assessment   Performance deficits / Impairments: Decreased functional mobility ; Decreased ADL status; Decreased endurance  Assessment: Pt moving well this date with SBA for functional mobility and transfers using quad cane and using compensatory techniques for ADLs. Pt anxious and asking about d/c this date. Cont POC. Prognosis: Fair  OT Education: OT Role;Plan of Care;Transfer Training  Patient Education: verb understanding  REQUIRES OT FOLLOW UP: Yes  Activity Tolerance  Activity Tolerance: Patient Tolerated treatment well  Safety Devices  Safety Devices in place: Yes  Type of devices: Call light within reach; Left in chair;Chair alarm in place;Nurse notified; All fall risk precautions in place;Gait belt;Patient at risk for falls         Patient Diagnosis(es): There were no encounter diagnoses.       has a past medical history of CAD (coronary artery disease), CVA (cerebral vascular accident) ([facility] [address] and Nicotine addiction. has a past surgical history that includes Cardiac catheterization (2010). Restrictions  Position Activity Restriction  Other position/activity restrictions: up with assist  Subjective   General  Chart Reviewed: Yes  Additional Pertinent Hx: Admit 1/5 with multiple seizures from [facility] - intubated 1/4-extubated 1/5    CT head- Right-sided encephalomalacia again demonstrated, cxray - neg                           PMHX: CAD, CVA - right middle cerebral artery distribution stroke, + smoker  Family / Caregiver Present: No  Diagnosis: Seizures  Subjective  Subjective: pt seated in chair upon arrival, agreeable to therapy session, \"I have to go to the bathroom,\" returned to bed at end of session \"I want to take a nap\"      Orientation  Orientation  Overall Orientation Status: Within Functional Limits  Objective    ADL  Feeding: Beverage management;Setup  Grooming: Setup;Supervision(wipe face with cloth, comb hair)  LE Dressing: Stand by assistance(pants)  Toileting: Stand by assistance(in stance at toilet, void urine)  Additional Comments: Pt with non functional LUE because of past CVA, pt able to compensate with one handed techniques        Balance  Sitting Balance: Supervision  Standing Balance: Stand by assistance  Standing Balance  Time: ~7 min  Activity: to and from bathroom, mobility in room x2 laps  Functional Mobility  Functional - Mobility Device: Cane(quad cane)  Activity: To/from bathroom; Other  Assist Level: Stand by assistance  Toilet Transfers  Toilet - Technique: Ambulating  Equipment Used: Standard toilet  Toilet Transfer: Stand by assistance  Bed mobility  Sit to Supine: Modified independent  Scooting: Modified independent  Transfers  Sit to stand: Stand by assistance  Stand to sit: Stand by assistance                       Cognition  Overall Cognitive Status: [facility]                                         Plan   Plan  Times per week: 2-5  Times per day: Daily  Current Treatment Recommendations: Endurance Training, Safety Education & Training, Self-Care / ADL, Equipment Evaluation, Education, & procurement, Patient/Caregiver Education & Training, Functional Mobility Training    AM-PAC Score        AM-PAC Inpatient Daily Activity Raw Score: 21 (01/08/21 1531)  AM-PAC Inpatient ADL T-Scale Score : 44.27 (01/08/21 1531)  ADL Inpatient CMS 0-100% Score: 32.79 (01/08/21 1531)  ADL Inpatient CMS G-Code Modifier : Jairo Boles (01/08/21 1531)    Goals  Short term goals  Time Frame for Short term goals: at d/c  Short term goal 1: Stance x 7 mins with Supervision for ADLs- ongoing  Short term goal 2: LE Dressing with Mod independence- ongoing  Short term goal 3: Commode transfers with Mod independence- ongoing  Patient Goals   Patient goals : Go home ASAP       Therapy Time   Individual Concurrent Group Co-treatment   Time In 1330         Time Out 1357         Minutes 27              Timed Code Treatment Minutes:   27    Total Treatment Minutes:  [address], OTR/L

## 2021-01-08 NOTE — PROGRESS NOTES
Pt is alert and oriented times 4. VSS. No evidence of seizures this shift. No neuro changes over night with left sided weakness from old CVA. Pt voiding adequately. IVF infusing. All fall precaution in place.

## 2021-01-08 NOTE — PROGRESS NOTES
Hospitalist Progress Note      PCP: No primary care provider on file. Date of Admission: 1/5/2021    Chief Complaint: Seizure    Subjective: Complains of diffuse muscle aches. Medications:  Reviewed    Infusion Medications    sodium bicarbonate infusion 150 mL/hr at 01/08/21 0559     Scheduled Medications    metoprolol tartrate  25 mg Oral BID    levETIRAcetam  1,000 mg Oral BID    thiamine  100 mg Oral Daily    heparin (porcine)  5,000 Units Subcutaneous 3 times per day    aspirin  81 mg Oral Daily    clopidogrel  75 mg Oral Daily    rosuvastatin  10 mg Oral Nightly    sodium chloride flush  10 mL Intravenous 2 times per day     PRN Meds: albuterol, nitroGLYCERIN, sodium chloride flush, promethazine **OR** ondansetron, polyethylene glycol, acetaminophen **OR** acetaminophen, potassium chloride, magnesium sulfate, LORazepam      Intake/Output Summary (Last 24 hours) at 1/8/2021 1241  Last data filed at 1/8/2021 1120  Gross per 24 hour   Intake 480 ml   Output 1900 ml   Net -1420 ml       Exam:    BP (!) 149/86   Pulse 70   Temp 98 °F (36.7 °C) (Oral)   Resp 16   Ht 5' 6\" (1.676 m)   Wt 134 lb 4.2 oz (60.9 kg)   SpO2 94%   BMI 21.67 kg/m²     General appearance: No apparent distress, appears stated age and cooperative. HEENT: Pupils equal, round, and reactive to light. Conjunctivae/corneas clear. Neck: Supple, with full range of motion. No jugular venous distention. Trachea midline. Respiratory:  Normal respiratory effort. Clear to auscultation, bilaterally without Rales/Wheezes/Rhonchi. Cardiovascular: Regular rate and rhythm with normal S1/S2 without murmurs, rubs or gallops. Abdomen: Soft, non-tender, non-distended with normal bowel sounds. Musculoskelatal: No clubbing, cyanosis or edema bilaterally. Skin: Skin color, texture, turgor normal.  No rashes or lesions. Neurologic:  Wakes to voice, speaks few words, independently moving all extremities.      Labs:   Recent Labs 01/06/21  0518 01/07/21  0508 01/08/21  0607   WBC 10.8 9.0 6.7   HGB 13.8 12.6* 12.8*   HCT 40.7 36.1* 37.3*    145 153     Recent Labs     01/06/21  0516 01/06/21  0518 01/07/21  0508 01/08/21  0607   NA  --  145 142 141   K  --  3.2* 2.9* 2.7*   CL  --  110 106 100   CO2  --  23 28 31   BUN  --  10 10 10   CREATININE  --  1.4* 1.1 1.0   CALCIUM  --  9.5 9.3 9.2   PHOS 2.2*  --  2.5 3.4     No results for input(s): AST, ALT, BILIDIR, BILITOT, ALKPHOS in the last 72 hours. Recent Labs     01/06/21 0518   INR 1.04     Recent Labs     01/06/21  0518 01/07/21  0508 01/08/21  0607   CKTOTAL 2,901* 6,182* 5,373*       Studies:  No orders to display       Assessment/Plan:    Active Hospital Problems    Diagnosis Date Noted    Mucopurulent chronic bronchitis ([facility] [address] [J41.1] 01/06/2021    Status epilepticus ([facility] [address] [G40.901] 01/05/2021    Respiratory failure requiring intubation ([facility] [address] [J96.90] 01/05/2021    History of CVA (cerebrovascular accident) [Z86.73]    Lactic acidosis likely due to above. History of CVA   Mild Hypotension, resolved  CAD  KINGS likely due to hypotension, rhabdo  COPD, chronic not in exacerbation    Neurology consulted  Status resolved, pt extubated 1/5, passed SLP eval today  Keppra dose 1 g BID  On IV fluids for rhabdo, check CK in am  Continue aspirin and plavix, statin   On thiamine  Seizure precautions.      Rhabdomyolysis, worsening, likely due to status  IV fluids with bicarb started this am @150  Monitor electrolytes    DVT Prophylaxis: Lovenox  Diet: DIET CARDIAC;  Code Status: Full Code    PT/OT Eval Status: pending    Dispo - Inpatient     Nelida Chávez DO

## 2021-01-08 NOTE — PLAN OF CARE
Problem: Skin Integrity:  Goal: Will show no infection signs and symptoms  Description: Will show no infection signs and symptoms  1/8/2021 1514 by Harsha Newberry RN  Outcome: Ongoing  Note: No signs of infection. VSS. Skin is clean/dry/intact. No new redness, swelling, or drainage. Problem: Falls - Risk of:  Goal: Will remain free from falls  Description: Will remain free from falls  1/8/2021 1514 by Harsha Newberry RN  Outcome: Ongoing  Note: Pt is up in chair with alarm on. Up x1 with cane tolerating ambulation well. Non-skid socks are on. Fall precautions in place. Call light and bedside table in reach.

## 2021-01-09 VITALS
TEMPERATURE: 98.1 F | HEART RATE: 82 BPM | BODY MASS INDEX: 21.58 KG/M2 | SYSTOLIC BLOOD PRESSURE: 147 MMHG | DIASTOLIC BLOOD PRESSURE: 87 MMHG | WEIGHT: 134.26 LBS | HEIGHT: 66 IN | OXYGEN SATURATION: 95 % | RESPIRATION RATE: 16 BRPM

## 2021-01-09 LAB
ALBUMIN SERPL-MCNC: 3.1 G/DL (ref 3.4–5)
ANION GAP SERPL CALCULATED.3IONS-SCNC: 13 MMOL/L (ref 3–16)
BASOPHILS ABSOLUTE: 0 K/UL (ref 0–0.2)
BASOPHILS RELATIVE PERCENT: 0.5 %
BUN BLDV-MCNC: 8 MG/DL (ref 7–20)
CALCIUM SERPL-MCNC: 9.4 MG/DL (ref 8.3–10.6)
CHLORIDE BLD-SCNC: 105 MMOL/L (ref 99–110)
CO2: 24 MMOL/L (ref 21–32)
CREAT SERPL-MCNC: 0.9 MG/DL (ref 0.9–1.3)
EOSINOPHILS ABSOLUTE: 0.3 K/UL (ref 0–0.6)
EOSINOPHILS RELATIVE PERCENT: 4.4 %
GFR AFRICAN AMERICAN: >60
GFR NON-AFRICAN AMERICAN: >60
GLUCOSE BLD-MCNC: 84 MG/DL (ref 70–99)
HCT VFR BLD CALC: 38.3 % (ref 40.5–52.5)
HEMOGLOBIN: 13.1 G/DL (ref 13.5–17.5)
LYMPHOCYTES ABSOLUTE: 1.6 K/UL (ref 1–5.1)
LYMPHOCYTES RELATIVE PERCENT: 27.3 %
MAGNESIUM: 1.6 MG/DL (ref 1.8–2.4)
MCH RBC QN AUTO: 30.2 PG (ref 26–34)
MCHC RBC AUTO-ENTMCNC: 34.1 G/DL (ref 31–36)
MCV RBC AUTO: 88.6 FL (ref 80–100)
MONOCYTES ABSOLUTE: 0.7 K/UL (ref 0–1.3)
MONOCYTES RELATIVE PERCENT: 11.1 %
NEUTROPHILS ABSOLUTE: 3.4 K/UL (ref 1.7–7.7)
NEUTROPHILS RELATIVE PERCENT: 56.7 %
PDW BLD-RTO: 13.2 % (ref 12.4–15.4)
PHOSPHORUS: 2.9 MG/DL (ref 2.5–4.9)
PLATELET # BLD: 187 K/UL (ref 135–450)
PMV BLD AUTO: 8.6 FL (ref 5–10.5)
POTASSIUM SERPL-SCNC: 3.2 MMOL/L (ref 3.5–5.1)
RBC # BLD: 4.33 M/UL (ref 4.2–5.9)
SODIUM BLD-SCNC: 142 MMOL/L (ref 136–145)
TOTAL CK: 2261 U/L (ref 39–308)
WBC # BLD: 5.9 K/UL (ref 4–11)

## 2021-01-09 PROCEDURE — 85025 COMPLETE CBC W/AUTO DIFF WBC: CPT

## 2021-01-09 PROCEDURE — 6370000000 HC RX 637 (ALT 250 FOR IP): Performed by: STUDENT IN AN ORGANIZED HEALTH CARE EDUCATION/TRAINING PROGRAM

## 2021-01-09 PROCEDURE — 6360000002 HC RX W HCPCS: Performed by: STUDENT IN AN ORGANIZED HEALTH CARE EDUCATION/TRAINING PROGRAM

## 2021-01-09 PROCEDURE — 82550 ASSAY OF CK (CPK): CPT

## 2021-01-09 PROCEDURE — 6370000000 HC RX 637 (ALT 250 FOR IP): Performed by: INTERNAL MEDICINE

## 2021-01-09 PROCEDURE — 36415 COLL VENOUS BLD VENIPUNCTURE: CPT

## 2021-01-09 PROCEDURE — 2580000003 HC RX 258: Performed by: PHYSICIAN ASSISTANT

## 2021-01-09 PROCEDURE — 6370000000 HC RX 637 (ALT 250 FOR IP): Performed by: PHYSICIAN ASSISTANT

## 2021-01-09 PROCEDURE — 83735 ASSAY OF MAGNESIUM: CPT

## 2021-01-09 PROCEDURE — 80069 RENAL FUNCTION PANEL: CPT

## 2021-01-09 PROCEDURE — 6360000002 HC RX W HCPCS: Performed by: INTERNAL MEDICINE

## 2021-01-09 RX ORDER — LANOLIN ALCOHOL/MO/W.PET/CERES
100 CREAM (GRAM) TOPICAL DAILY
Qty: 30 TABLET | Refills: 3 | COMMUNITY
Start: 2021-01-10

## 2021-01-09 RX ORDER — POTASSIUM CHLORIDE 20 MEQ/1
40 TABLET, EXTENDED RELEASE ORAL 2 TIMES DAILY WITH MEALS
Status: DISCONTINUED | OUTPATIENT
Start: 2021-01-09 | End: 2021-01-09 | Stop reason: HOSPADM

## 2021-01-09 RX ORDER — POTASSIUM CHLORIDE 20 MEQ/1
40 TABLET, EXTENDED RELEASE ORAL DAILY
Qty: 60 TABLET | Refills: 3 | Status: SHIPPED | OUTPATIENT
Start: 2021-01-09 | End: 2021-01-23

## 2021-01-09 RX ORDER — MAGNESIUM OXIDE 240 MG
400 POWDER IN PACKET (EA) ORAL DAILY
Qty: 30 EACH | Refills: 0 | Status: SHIPPED | OUTPATIENT
Start: 2021-01-09 | End: 2021-01-23

## 2021-01-09 RX ORDER — MAGNESIUM SULFATE IN WATER 40 MG/ML
4 INJECTION, SOLUTION INTRAVENOUS ONCE
Status: COMPLETED | OUTPATIENT
Start: 2021-01-09 | End: 2021-01-09

## 2021-01-09 RX ORDER — LEVETIRACETAM 500 MG/1
1000 TABLET ORAL 2 TIMES DAILY
Qty: 60 TABLET | Refills: 3 | Status: SHIPPED | OUTPATIENT
Start: 2021-01-09

## 2021-01-09 RX ADMIN — CLOPIDOGREL BISULFATE 75 MG: 75 TABLET, FILM COATED ORAL at 09:12

## 2021-01-09 RX ADMIN — Medication 10 ML: at 09:06

## 2021-01-09 RX ADMIN — ASPIRIN 81 MG: 81 TABLET, CHEWABLE ORAL at 09:12

## 2021-01-09 RX ADMIN — MAGNESIUM SULFATE HEPTAHYDRATE 4 G: 40 INJECTION, SOLUTION INTRAVENOUS at 09:26

## 2021-01-09 RX ADMIN — POTASSIUM CHLORIDE AND SODIUM CHLORIDE: 900; 150 INJECTION, SOLUTION INTRAVENOUS at 05:53

## 2021-01-09 RX ADMIN — METOPROLOL TARTRATE 25 MG: 25 TABLET, FILM COATED ORAL at 09:12

## 2021-01-09 RX ADMIN — Medication 100 MG: at 09:13

## 2021-01-09 RX ADMIN — LEVETIRACETAM 1000 MG: 500 TABLET ORAL at 09:12

## 2021-01-09 RX ADMIN — HEPARIN SODIUM 5000 UNITS: 5000 INJECTION INTRAVENOUS; SUBCUTANEOUS at 06:24

## 2021-01-09 RX ADMIN — HEPARIN SODIUM 5000 UNITS: 5000 INJECTION INTRAVENOUS; SUBCUTANEOUS at 15:01

## 2021-01-09 RX ADMIN — POTASSIUM CHLORIDE 40 MEQ: 1500 TABLET, EXTENDED RELEASE ORAL at 09:12

## 2021-01-09 NOTE — DISCHARGE SUMMARY
Hospital Medicine Discharge Summary    Patient: Zoraida Marroquin     Gender: male  : 1960   Age: 64 y.o. MRN: [id number]    Admitting Physician: Jovani Ariza MD  Discharge Physician: Monet Santos DO    Code Status: Full Code     Admit Date: 2021   Discharge Date:   2021    Disposition:  Home     Discharge Diagnoses: Active Hospital Problems    Diagnosis Date Noted    Mucopurulent chronic bronchitis ([address] [J41.1] 2021    Status epilepticus ([facility] [address] [G40.901] 2021    Respiratory failure requiring intubation ([facility] [address] [J96.90] 2021    History of CVA (cerebrovascular accident) [Z86.73]          Outpatient to do list:     1) Follow-up appointments:    Primary care provider  Neurology    Condition at Discharge: [address] Course:   Patient was admitted for evaluation of seizure, was intubated for airway protection, and quickly extubated, also KINGS. Neurology evaluated. Started on higher dose Keppra. Treated for rhabdomyolysis. KINGS resolved. Electrolyte abnormalities resolving, discharged with supplementation and thiamine. CAD, COPD chronic, no exacerbation.      Additional findings or notes to primary provider:  None at this time    Discharge Medications:   Current Discharge Medication List      START taking these medications    Details   potassium chloride (KLOR-CON M) 20 MEQ extended release tablet Take 2 tablets by mouth daily for 14 days  Qty: 60 tablet, Refills: 3      thiamine 100 MG tablet Take 1 tablet by mouth daily  Qty: 30 tablet, Refills: 3      Magnesium Oxide (MAGNESIUM OXIDE 400) 240 MG PACK Take 400 mg by mouth daily for 14 days  Qty: 30 each, Refills: 0           Current Discharge Medication List      CONTINUE these medications which have CHANGED    Details   levETIRAcetam (KEPPRA) 500 MG tablet Take 2 tablets by mouth 2 times daily  Qty: 60 tablet, Refills: 3           Current Discharge Medication List      CONTINUE these medications which have NOT CHANGED    Details   metoprolol tartrate (LOPRESSOR) 25 MG tablet Take 25 mg by mouth 2 times daily      traZODone (DESYREL) 50 MG tablet Take  mg by mouth nightly      albuterol sulfate HFA (VENTOLIN HFA) 108 (90 Base) MCG/ACT inhaler Inhale 2 puffs into the lungs every 6 hours as needed for Wheezing      aspirin 81 MG chewable tablet Take 81 mg by mouth daily      clopidogrel (PLAVIX) 75 MG tablet Take 1 tablet by mouth daily       nitroGLYCERIN (NITROSTAT) 0.4 MG SL tablet Place 0.4 mg under the tongue every 5 minutes as needed for Chest pain      rosuvastatin (CRESTOR) 10 MG tablet Take 10 mg by mouth daily           Current Discharge Medication List          Discharge ROS:  A complete review of systems was asked and negative. Discharge Exam:    BP (!) 147/87   Pulse 82   Temp 98.1 °F (36.7 °C) (Oral)   Resp 16   Ht 5' 6\" (1.676 m)   Wt 134 lb 4.2 oz (60.9 kg)   SpO2 95%   BMI 21.67 kg/m²   General appearance:  NAD  HEENT:   Normal cephalic, atraumatic, moist mucous membranes, no oropharyngeal erythema or exudate  Neck: Supple, trachea midline, no anterior cervical or SC LAD  Heart[de-identified] Normal s1/s2, RRR, no murmurs, gallops, or rubs. No leg edema  Lungs:  Clear to auscultation bilaterally, no wheeze, rales or rhonchi, no use of accessory musclesNormal respiratory effort. Clear to auscultation, bilaterally without Rales/Wheezes/Rhonchi. Abdomen: Soft, non-tender, non-distended, bowel sounds present, no masses  Musculoskeletal:  No clubbing, no cyanosis, or edema  Skin: No lesion or masses  Neurologic:  Cannot use left arm. Psychiatric:  Alert and oriented, thought content appropriate    Labs:  For convenience and continuity at follow-up the following most recent labs are provided:    Lab Results   Component Value Date    WBC 5.9 01/09/2021    HGB 13.1 01/09/2021    HCT 38.3 01/09/2021    MCV 88.6 01/09/2021     01/09/2021     01/09/2021    K 3.2 01/09/2021    K 3.0 01/08/2021    CL 105 01/09/2021    CO2 24 01/09/2021    BUN 8 01/09/2021    CREATININE 0.9 01/09/2021    CALCIUM 9.4 01/09/2021    PHOS 2.9 01/09/2021    BNP 24.2 05/03/2010    ALKPHOS 116 01/04/2021    ALT 30 01/04/2021    AST 25 01/04/2021    BILITOT <0.2 01/04/2021    LABALBU 3.1 01/09/2021    LDLCALC 25 05/17/2019     Lab Results   Component Value Date    INR 1.04 01/06/2021    INR 0.97 05/17/2019    INR 0.95 05/03/2010       Radiology:  Ct Head Wo Contrast    Result Date: 1/4/2021  EXAMINATION: CT OF THE HEAD WITHOUT CONTRAST  1/4/2021 10:50 pm TECHNIQUE: CT of the head was performed without the administration of intravenous contrast. Dose modulation, iterative reconstruction, and/or weight based adjustment of the mA/kV was utilized to reduce the radiation dose to as low as reasonably achievable. COMPARISON: 01/07/2020 HISTORY: ORDERING SYSTEM PROVIDED HISTORY: status epilepticus TECHNOLOGIST PROVIDED HISTORY: Has a \"code stroke\" or \"stroke alert\" been called? ->No Reason for exam:->status epilepticus Reason for Exam: status epilepticus, pt sz during exam Relevant Medical/Surgical History: hx cva FINDINGS: BRAIN/VENTRICLES: Evaluation is limited by motion artifact despite repeat imaging. No acute hemorrhage is identified. Multifocal encephalomalacia is again seen involving the right temporal lobe extending into the basal ganglia and external capsule. Calcification is again seen along the lateral margin of the body of the right lateral ventricle. There is no evidence to suggest acute ischemia. There is no hydrocephalus. ORBITS: The visualized portion of the orbits demonstrate no acute abnormality. SINUSES: The visualized paranasal sinuses and mastoid air cells demonstrate no acute abnormality. SOFT TISSUES/SKULL:  No acute abnormality of the visualized skull or soft tissues. Endotracheal and orogastric tubes are noted. No acute intracranial abnormality. Right-sided encephalomalacia again demonstrated.      Xr Chest Portable    Result Date: 1/4/2021  EXAMINATION: ONE XRAY VIEW OF THE CHEST 1/4/2021 5:49 pm COMPARISON: 01/07/2020 HISTORY: ORDERING SYSTEM PROVIDED HISTORY: intubation TECHNOLOGIST PROVIDED HISTORY: Reason for exam:->intubation Reason for Exam: post intubation Acuity: Acute Type of Exam: Initial FINDINGS: Endotracheal tube approximately 4 cm above the aislinn. Patient rotated to the right. Heart size normal.  Pulmonary vasculature within normal limits. Costophrenic angles sharp     1. Endotracheal tube 4 cm above the aislinn 2. No acute cardiopulmonary process demonstrated             The patient was seen and examined on day of discharge and this discharge summary is in conjunction with any daily progress note from day of discharge. Time Spent on discharge is more than 30 minutes in the examination, evaluation, counseling and review of medications and discharge plan. Quincy Simpson DO   1/9/2021      Thank you No primary care provider on file. for the opportunity to be involved in this patient's care. If you have any questions or concerns please feel free to contact [facility] at [facility].

## 2021-01-09 NOTE — PROGRESS NOTES
Patient currently resting in bed. Alert and oriented x 4. Patient pleasant and cooperative with care. Patient in no acute distress. Denies any pain or discomfort. Nurse will continue to monitor/reassess. Call light within reach.  Felipe Cheung

## 2021-01-09 NOTE — PROGRESS NOTES
Patient discharged. Taken to private vehicle via wheelchair. Patient belongings sent with patient. IV removed. Tip intact and 2 x 2 pressure dressing applied. Patient given written and verbal discharge instructions. Patient verbalized understanding. Also given prescriptions.  Elizabeth Solorzano

## 2021-01-09 NOTE — PROGRESS NOTES
Patient is A/O x4, VSS, and denies pain at this time. Patient tolerating PO liquids and diet well. Patient is voiding well with urinal, has BRP for bowel movements. Fall precautions in place.

## 2021-01-09 NOTE — PLAN OF CARE
Problem: Skin Integrity:  Goal: Absence of new skin breakdown  Description: Absence of new skin breakdown  Outcome: Ongoing  Note: Patient will cooperate with staff on repositioning to avoid skin and tissue breakdown. Problem: Falls - Risk of:  Goal: Will remain free from falls  Description: Will remain free from falls  1/9/2021 0146 by Cornelius Nunez RN  Outcome: Ongoing  Note: Patient will remain free from falls. Patient will use call light to notify staff of needs prior to exiting the bed. Patient's bed will remain in lowest position with wheels locked and bed alarm engaged.

## 2021-04-23 ENCOUNTER — APPOINTMENT (OUTPATIENT)
Dept: CT IMAGING | Age: 61
End: 2021-04-23

## 2021-04-23 ENCOUNTER — HOSPITAL ENCOUNTER (EMERGENCY)
Age: 61
Discharge: HOME | End: 2021-04-23
Attending: STUDENT IN AN ORGANIZED HEALTH CARE EDUCATION/TRAINING PROGRAM

## 2021-04-23 VITALS
SYSTOLIC BLOOD PRESSURE: 139 MMHG | BODY MASS INDEX: 21.22 KG/M2 | TEMPERATURE: 98.1 F | WEIGHT: 132.06 LBS | DIASTOLIC BLOOD PRESSURE: 93 MMHG | OXYGEN SATURATION: 97 % | HEART RATE: 69 BPM | HEIGHT: 66 IN | RESPIRATION RATE: 21 BRPM

## 2021-04-23 DIAGNOSIS — R56.9 SEIZURE (HCC): Primary | ICD-10-CM

## 2021-04-23 LAB
A/G RATIO: 1.3 (ref 1.1–2.2)
ALBUMIN SERPL-MCNC: 4.3 G/DL (ref 3.4–5)
ALP BLD-CCNC: 109 U/L (ref 40–129)
ALT SERPL-CCNC: 10 U/L (ref 10–40)
ANION GAP SERPL CALCULATED.3IONS-SCNC: 24 MMOL/L (ref 3–16)
AST SERPL-CCNC: 15 U/L (ref 15–37)
BASOPHILS ABSOLUTE: 0 K/UL (ref 0–0.2)
BASOPHILS RELATIVE PERCENT: 0.4 %
BILIRUB SERPL-MCNC: <0.2 MG/DL (ref 0–1)
BUN BLDV-MCNC: 5 MG/DL (ref 7–20)
CALCIUM SERPL-MCNC: 10.4 MG/DL (ref 8.3–10.6)
CHLORIDE BLD-SCNC: 100 MMOL/L (ref 99–110)
CO2: 17 MMOL/L (ref 21–32)
CREAT SERPL-MCNC: 1 MG/DL (ref 0.9–1.3)
EOSINOPHILS ABSOLUTE: 0.1 K/UL (ref 0–0.6)
EOSINOPHILS RELATIVE PERCENT: 2 %
GFR AFRICAN AMERICAN: >60
GFR NON-AFRICAN AMERICAN: >60
GLOBULIN: 3.2 G/DL
GLUCOSE BLD-MCNC: 91 MG/DL (ref 70–99)
HCT VFR BLD CALC: 50.4 % (ref 40.5–52.5)
HEMOGLOBIN: 16.6 G/DL (ref 13.5–17.5)
KEPPRA DOSE AMT: NORMAL
KEPPRA: 10.1 UG/ML (ref 6–46)
LYMPHOCYTES ABSOLUTE: 3 K/UL (ref 1–5.1)
LYMPHOCYTES RELATIVE PERCENT: 41.4 %
MCH RBC QN AUTO: 30.4 PG (ref 26–34)
MCHC RBC AUTO-ENTMCNC: 33 G/DL (ref 31–36)
MCV RBC AUTO: 92.3 FL (ref 80–100)
MONOCYTES ABSOLUTE: 0.7 K/UL (ref 0–1.3)
MONOCYTES RELATIVE PERCENT: 9 %
NEUTROPHILS ABSOLUTE: 3.5 K/UL (ref 1.7–7.7)
NEUTROPHILS RELATIVE PERCENT: 47.2 %
PDW BLD-RTO: 14.1 % (ref 12.4–15.4)
PLATELET # BLD: 240 K/UL (ref 135–450)
PMV BLD AUTO: 8.6 FL (ref 5–10.5)
POTASSIUM SERPL-SCNC: 3.1 MMOL/L (ref 3.5–5.1)
RBC # BLD: 5.46 M/UL (ref 4.2–5.9)
SODIUM BLD-SCNC: 141 MMOL/L (ref 136–145)
TOTAL PROTEIN: 7.5 G/DL (ref 6.4–8.2)
WBC # BLD: 7.3 K/UL (ref 4–11)

## 2021-04-23 PROCEDURE — 85025 COMPLETE CBC W/AUTO DIFF WBC: CPT

## 2021-04-23 PROCEDURE — 80053 COMPREHEN METABOLIC PANEL: CPT

## 2021-04-23 PROCEDURE — 90715 TDAP VACCINE 7 YRS/> IM: CPT | Performed by: PHYSICIAN ASSISTANT

## 2021-04-23 PROCEDURE — 90471 IMMUNIZATION ADMIN: CPT | Performed by: PHYSICIAN ASSISTANT

## 2021-04-23 PROCEDURE — 99284 EMERGENCY DEPT VISIT MOD MDM: CPT

## 2021-04-23 PROCEDURE — 2580000003 HC RX 258: Performed by: PHYSICIAN ASSISTANT

## 2021-04-23 PROCEDURE — 6360000002 HC RX W HCPCS: Performed by: PHYSICIAN ASSISTANT

## 2021-04-23 PROCEDURE — 70450 CT HEAD/BRAIN W/O DYE: CPT

## 2021-04-23 PROCEDURE — 96365 THER/PROPH/DIAG IV INF INIT: CPT

## 2021-04-23 PROCEDURE — 80177 DRUG SCRN QUAN LEVETIRACETAM: CPT

## 2021-04-23 PROCEDURE — 6370000000 HC RX 637 (ALT 250 FOR IP): Performed by: PHYSICIAN ASSISTANT

## 2021-04-23 RX ORDER — POTASSIUM CHLORIDE 20 MEQ/1
40 TABLET, EXTENDED RELEASE ORAL ONCE
Status: COMPLETED | OUTPATIENT
Start: 2021-04-23 | End: 2021-04-23

## 2021-04-23 RX ORDER — LEVETIRACETAM 10 MG/ML
1000 INJECTION INTRAVASCULAR ONCE
Status: COMPLETED | OUTPATIENT
Start: 2021-04-23 | End: 2021-04-23

## 2021-04-23 RX ORDER — 0.9 % SODIUM CHLORIDE 0.9 %
1000 INTRAVENOUS SOLUTION INTRAVENOUS ONCE
Status: COMPLETED | OUTPATIENT
Start: 2021-04-23 | End: 2021-04-23

## 2021-04-23 RX ADMIN — POTASSIUM CHLORIDE 40 MEQ: 1500 TABLET, EXTENDED RELEASE ORAL at 11:45

## 2021-04-23 RX ADMIN — SODIUM CHLORIDE 1000 ML: 9 INJECTION, SOLUTION INTRAVENOUS at 10:50

## 2021-04-23 RX ADMIN — LEVETIRACETAM 1000 MG: 10 INJECTION INTRAVENOUS at 10:54

## 2021-04-23 RX ADMIN — TETANUS TOXOID, REDUCED DIPHTHERIA TOXOID AND ACELLULAR PERTUSSIS VACCINE, ADSORBED 0.5 ML: 5; 2.5; 8; 8; 2.5 SUSPENSION INTRAMUSCULAR at 10:56

## 2021-04-23 ASSESSMENT — ENCOUNTER SYMPTOMS
VOMITING: 0
ABDOMINAL PAIN: 0
SHORTNESS OF BREATH: 0
NAUSEA: 0

## 2021-04-23 ASSESSMENT — PAIN SCALES - GENERAL: PAINLEVEL_OUTOF10: 0

## 2021-04-23 NOTE — ED PROVIDER NOTES
[address] [facility]        Pt Name: Carl Mcclendon  MRN: [id number]  [place] 1960  Date of evaluation: 4/23/2021  Provider: PAOLA Spivey    This patient was seen and evaluated by the attending physician Dr. Marilia Guillen. CHIEF COMPLAINT       Chief Complaint   Patient presents with    Seizures         HISTORY OF PRESENT ILLNESS  (Location/Symptom, Timing/Onset, Context/Setting, Quality, Duration,Modifying Factors, Severity.)   Walter Govea is a 64 y.o. male who presents to the emergencydepartment after seizure. EMS reported patient's roommate called after patient had a seizure. No reported history of fall. Also reported that when the patient has a seizure, he normally has multiple. No other history available. Patient is postictal.  He does report a history of seizures but does not know what medication he takes. We have Keppra listed on our chart here. He reports his neurologist is at the [place]. He is oriented to person and time but not place. He thinks he is at home. Denies any pain. Denies chest pain, shortness of breath, abdominal pain, nausea, vomiting, fever. He denies drug or alcohol use. Did not receive any meds en route. Nursing Notes were reviewed and I agree. OF SYSTEMS    (2-9 systems for level 4, 10 or more for level 5)     Review of Systems   Constitutional: Negative for fever. Respiratory: Negative for shortness of breath. Cardiovascular: Negative for chest pain. Gastrointestinal: Negative for abdominal pain, nausea and vomiting. Neurological: Positive for seizures. Except as noted above the remainder of the review of systems was reviewed and negative.        PAST MEDICAL HISTORY         Diagnosis Date    CAD (coronary artery disease)     CVA [facility]cerebral vascular accident) ([facility] [address]     Nicotine addiction        SURGICAL HISTORY         Procedure Laterality Date    CARDIAC CATHETERIZATION  2010       CURRENT MEDICATIONS       Discharge Medication List as of 4/23/2021 11:47 AM      CONTINUE these medications which have NOT CHANGED    Details   levETIRAcetam (KEPPRA) 500 MG tablet Take 2 tablets by mouth 2 times daily, Disp-60 tablet, R-3Print      potassium chloride (KLOR-CON M) 20 MEQ extended release tablet Take 2 tablets by mouth daily for 14 days, Disp-60 tablet, R-3Print      thiamine 100 MG tablet Take 1 tablet by mouth daily, Disp-30 tablet, R-3OTC      Magnesium Oxide (MAGNESIUM OXIDE 400) 240 MG PACK Take 400 mg by mouth daily for 14 days, Disp-30 each, R-0Print      metoprolol tartrate (LOPRESSOR) 25 MG tablet Take 25 mg by mouth 2 times dailyHistorical Med      rosuvastatin (CRESTOR) 10 MG tablet Take 10 mg by mouth dailyHistorical Med      traZODone (DESYREL) 50 MG tablet Take  mg by mouth nightlyHistorical Med      albuterol sulfate HFA (VENTOLIN HFA) 108 (90 Base) MCG/ACT inhaler Inhale 2 puffs into the lungs every 6 hours as needed for WheezingHistorical Med      aspirin 81 MG chewable tablet Take 81 mg by mouth dailyHistorical Med      clopidogrel (PLAVIX) 75 MG tablet Take 1 tablet by mouth daily Historical Med      nitroGLYCERIN (NITROSTAT) 0.4 MG SL tablet Place 0.4 mg under the tongue every 5 minutes as needed for Chest painHistorical Med             ALLERGIES     Patient has no known allergies. FAMILY HISTORY     History reviewed. No pertinent family history. No family status information on file. SOCIAL HISTORY      reports that he has been smoking. He has been smoking about 1.00 pack per day. He has never used smokeless tobacco. He reports current alcohol use. He reports that he does not use drugs.     PHYSICAL EXAM    (up to 7 for level 4, 8 or more for level 5)     ED Triage Vitals [04/23/21 0919]   BP Temp Temp Source Pulse Resp SpO2 Height Weight   107/88 98.1 °F (36.7 °C) Temporal 94 30 95 % 5' 6\" (1.676 m) 132 lb 0.9 oz (59.9 kg) Physical Exam  Constitutional:       General: He is not in acute distress. Appearance: He is not ill-appearing, toxic-appearing or diaphoretic. Comments: Drowsy but arousable   HENT:      Head: Normocephalic and atraumatic. Mouth/Throat:      Comments: Superficial bite marks noted on the tip of the tongue  Eyes:      Conjunctiva/sclera: Conjunctivae normal.      Pupils: Pupils are equal, round, and reactive to light. Neck:      Musculoskeletal: Normal range of motion and neck supple. Cardiovascular:      Rate and Rhythm: Normal rate and regular rhythm. Heart sounds: Normal heart sounds. Pulmonary:      Effort: Pulmonary effort is normal. No respiratory distress. Breath sounds: Normal breath sounds. Abdominal:      General: There is no distension. Palpations: Abdomen is soft. There is no mass. Tenderness: There is no abdominal tenderness. There is no guarding or rebound. Hernia: No hernia is present. Musculoskeletal:      Comments: No tenderness palpation midline spine. No tenderness palpation bilateral shoulders, elbows, wrists, knees, thighs, ankles, hips   Skin:     General: Skin is warm. Neurological:      Comments: Drowsy, but arousable         DIFFERENTIAL DIAGNOSIS   Seizure, [place] abnormality, alcohol drug withdrawal, other    DIAGNOSTICRESULTS         RADIOLOGY:   Non-plain film images such as CT, Ultrasound and MRI are read by the radiologist. Plain radiographic images are visualized and preliminarily interpreted by PAOLA Cervantes with the below findings:      Interpretation per the Radiologist below, if available at the time of this note:    CT HEAD WO CONTRAST   Final Result   1. No acute intracranial abnormality.                LABS:  Results for orders placed or performed during the hospital encounter of 04/23/21   CBC Auto Differential   Result Value Ref Range    WBC 7.3 4.0 - 11.0 K/uL    RBC 5.46 4.20 - 5.90 M/uL    Hemoglobin 16.6 13.5 - 17.5 g/dL    Hematocrit 50.4 40.5 - 52.5 %    MCV 92.3 80.0 - 100.0 fL    MCH 30.4 26.0 - 34.0 pg    MCHC 33.0 31.0 - 36.0 g/dL    RDW 14.1 12.4 - 15.4 %    Platelets 814 279 - 509 K/uL    MPV 8.6 5.0 - 10.5 fL    Neutrophils % 47.2 %    Lymphocytes % 41.4 %    Monocytes % 9.0 %    Eosinophils % 2.0 %    Basophils % 0.4 %    Neutrophils Absolute 3.5 1.7 - 7.7 K/uL    Lymphocytes Absolute 3.0 1.0 - 5.1 K/uL    Monocytes Absolute 0.7 0.0 - 1.3 K/uL    Eosinophils Absolute 0.1 0.0 - 0.6 K/uL    Basophils Absolute 0.0 0.0 - 0.2 K/uL   Comprehensive Metabolic Panel   Result Value Ref Range    Sodium 141 136 - 145 mmol/L    Potassium 3.1 (L) 3.5 - 5.1 mmol/L    Chloride 100 99 - 110 mmol/L    CO2 17 (L) 21 - 32 mmol/L    Anion Gap 24 (H) 3 - 16    Glucose 91 70 - 99 mg/dL    BUN 5 (L) 7 - 20 mg/dL    CREATININE 1.0 0.9 - 1.3 mg/dL    GFR Non-African American >60 >60    GFR African American >60 >60    Calcium 10.4 8.3 - 10.6 mg/dL    Total Protein 7.5 6.4 - 8.2 g/dL    Albumin 4.3 3.4 - 5.0 g/dL    Albumin/Globulin Ratio 1.3 1.1 - 2.2    Total Bilirubin <0.2 0.0 - 1.0 mg/dL    Alkaline Phosphatase 109 40 - 129 U/L    ALT 10 10 - 40 U/L    AST 15 15 - 37 U/L    Globulin 3.2 g/dL   Levetiracetam Level   Result Value Ref Range    Levetiracetam Lvl 10.1 6.0 - 46.0 ug/mL    KEPPRA Dose Amt Unknown        All other labs were within normal range or not returned as of this dictation. EMERGENCY DEPARTMENT COURSE and DIFFERENTIALDIAGNOSIS/MDM:   Vitals:    Vitals:    04/23/21 1115 04/23/21 1130 04/23/21 1145 04/23/21 1200   BP: (!) 149/106 (!) 156/97 139/88 (!) 139/93   Pulse: 75 70 70 69   Resp: 21 23 20 21   Temp:       TempSrc:       SpO2: 99% 98%  97%   Weight:       Height:           Patient wasnontoxic, well appearing, afebrile with normal vital signs. Saturating well on room air. Reportedly had a seizure. Appears postictal now. Roommate then called and spoke with RN.   Roommate reported this seizure seemed different because patient pointed to the right side of his head before the seizure. CT head added on. From chart review, was seen in this ED 1/4/21 after having 4 seizures. Was in status and was intubated. Was transferred to [facility] for continuous EEG monitoring. From neurology note, Keppra dose was increased to 1000 BID. CBC grossly normal.  Keppra level 10. CMP with hypokalemia 3.1, bicarb 17, anion gap 24. Suspect elevated anion gap is from lactic acidosis from seizure. Was given 1 L fluids. Potassium replaced orally. Upin reevaluation, patient is more alert and is normal.  Having normal conversations with nurse and myself. He is no longer post ictal.  Reports he was sitting in chair when his left arm started twitching and then his tongue felt weird. Knew he was going to have a seizure. Missed his Keppra dose yesterday morning. On repeat exam, no facial droop, 5/5 strength bilateral lower extremities and RUE. LUE chronically weak from hx CVA. Was given loading dose of Keppra since his level was near the lower limit of normal and he missed dose yesterday. Instructed to FU with neurologist at the [place] in next few days for reevaluation,  No driving until seen by neuro. Return for worsening. He agreed and understood. PROCEDURES:  None    FINAL IMPRESSION      1.  Seizure [facility])        DISPOSITION/PLAN   DISPOSITION Decision To Discharge 04/23/2021 12:19:40 PM      PATIENT REFERRED TO:  Your neurologist at the [place]    Schedule an appointment as soon as possible for a visit in 3 days  for reevaluation    [facility] Emergency Department  [address]  [phone]    As needed, If symptoms worsen      DISCHARGE MEDICATIONS:  Discharge Medication List as of 4/23/2021 11:47 AM          (Please note that portions ofthis note were completed with a voice recognition program.  Efforts were made to edit the dictations but occasionally words are mis-transcribed.)    Belkis Banker, [address] [place] [address]  04/23/21 2013

## 2021-04-23 NOTE — ED NOTES
Discharge and education instructions reviewed. Patient verbalized understanding, teach-back successful. Patient denied questions at this time. No acute distress noted. Patient instructed to follow-up as noted - return to emergency department if symptoms worsen. Patient verbalized understanding. Discharged per EDMD with discharge instructions.           Perfecto Rivas RN  04/23/21 4656

## 2021-04-23 NOTE — ED NOTES

## 2021-04-23 NOTE — ED NOTES
Carmen Alberto called, patient roommate, states this seizure was different because patient was painting to the right side of his head when he started seizing.        Avila Justice RN  04/23/21 2104

## 2021-04-23 NOTE — ED NOTES
Charge nurse setting up LYFT to get patient home      Violette Blevins, [address] [facility]  04/23/21 3826

## 2021-04-23 NOTE — ED TRIAGE NOTES
Arrived via squad for seizure at home, postictal upon arrival.  Able to answer questions shortly after arrival.

## 2021-04-23 NOTE — ED PROVIDER NOTES
Attending Supervisory Note/Shared Visit   I have personally performed a face to face diagnostic evaluation on this patient. I have reviewed the mid-levels findings and agree. History and Exam by me shows a 45-year-old man who presents to the emergency department after a seizure, EMS brought in the patient, reported that his roommate had witnessed a seizure that is described as generalized convulsions, has significant history of seizures in the past, has been admitted before for status epilepticus. History reported to me by Ms. Lisandro Baires initially limited secondary to patient's postictal state, he is confused and somnolent, and w/u initiated included labs and CTH as ordered and resulted, IVFs. At the time of my evaluation, patient has a negative head CT, is now AOx4, GCS 15 has slight left lower extremity weakness which is consistent with his prior stroke, patient confirms that this is his baseline, states that he has not been compliant with his antiepileptic medication which is Keppra, he is loaded with Keppra in the emergency department, given his noncompliance, given absence of remarkable features on his laboratory w/u aside from mild acidosis likely 2/2 lactate and imaging work-up (CTH neg acute) and the fact that he is back to his neurologic baseline, recommend patient follow-up with his outpatient neurologist and adhere to his medication regimen, patient voiced understanding, is amenable to discharge home. Has no infectious sxs. After IV fluid hydration expect clearance of acidosis that he had on his metabolic panel. Patient tolerated p.o., ambulated at his baseline in the emergency department, subsequently discharged with return precautions for which she voiced understanding. Pt does not drive, reiterated driving precautions to not drive just in case. Has antiepileptic medication at home. No further convulsions in the ED. Departed the ED in stable condition.     CT HEAD WO CONTRAST   Final Result 1. No acute intracranial abnormality.            Labs Reviewed   COMPREHENSIVE METABOLIC PANEL - Abnormal; Notable for the following components:       Result Value    Potassium 3.1 (*)     CO2 17 (*)     Anion Gap 24 (*)     BUN 5 (*)     All other components within normal limits    Narrative:     Performed at:  [facility]  [address] [place], [place] [address]   Phone [phone]   CBC WITH AUTO DIFFERENTIAL    Narrative:     Performed at:  [facility]  [address] [place], [place] [address]   Phone [phone]   LEVETIRACETAM LEVEL    Narrative:     Performed at:  [facility]  [address] [place], [place] [address]   Phone [phone]           Katie Kohler MD  Attending Emergency Physician         Katie Kohler MD  04/23/21 Alejandra Hall

## 2021-04-23 NOTE — ED NOTES
Bed: A-17  Expected date:   Expected time:   Means of arrival: [facility]  Comments:     Kwabena Bacon RN  04/23/21 9116

## 2021-06-30 ENCOUNTER — HOSPITAL ENCOUNTER (EMERGENCY)
Age: 61
Discharge: HOME | End: 2021-06-30
Attending: EMERGENCY MEDICINE
Payer: OTHER GOVERNMENT

## 2021-06-30 VITALS
SYSTOLIC BLOOD PRESSURE: 154 MMHG | HEIGHT: 66 IN | RESPIRATION RATE: 22 BRPM | HEART RATE: 77 BPM | TEMPERATURE: 97.8 F | BODY MASS INDEX: 20.41 KG/M2 | WEIGHT: 126.98 LBS | DIASTOLIC BLOOD PRESSURE: 91 MMHG | OXYGEN SATURATION: 94 %

## 2021-06-30 DIAGNOSIS — E87.6 HYPOKALEMIA: ICD-10-CM

## 2021-06-30 DIAGNOSIS — Z91.14 NON COMPLIANCE W MEDICATION REGIMEN: ICD-10-CM

## 2021-06-30 DIAGNOSIS — R56.9 WITNESSED SEIZURE (HCC): Primary | ICD-10-CM

## 2021-06-30 LAB
A/G RATIO: 1.4 (ref 1.1–2.2)
ALBUMIN SERPL-MCNC: 4.4 G/DL (ref 3.4–5)
ALP BLD-CCNC: 119 U/L (ref 40–129)
ALT SERPL-CCNC: 6 U/L (ref 10–40)
AMPHETAMINE SCREEN, URINE: ABNORMAL
ANION GAP SERPL CALCULATED.3IONS-SCNC: 17 MMOL/L (ref 3–16)
AST SERPL-CCNC: 11 U/L (ref 15–37)
BARBITURATE SCREEN URINE: ABNORMAL
BASOPHILS ABSOLUTE: 0.1 K/UL (ref 0–0.2)
BASOPHILS RELATIVE PERCENT: 0.9 %
BENZODIAZEPINE SCREEN, URINE: ABNORMAL
BILIRUB SERPL-MCNC: 1 MG/DL (ref 0–1)
BUN BLDV-MCNC: 4 MG/DL (ref 7–20)
CALCIUM SERPL-MCNC: 10.2 MG/DL (ref 8.3–10.6)
CANNABINOID SCREEN URINE: POSITIVE
CHLORIDE BLD-SCNC: 103 MMOL/L (ref 99–110)
CO2: 21 MMOL/L (ref 21–32)
COCAINE METABOLITE SCREEN URINE: ABNORMAL
CREAT SERPL-MCNC: 0.9 MG/DL (ref 0.9–1.3)
EOSINOPHILS ABSOLUTE: 0.1 K/UL (ref 0–0.6)
EOSINOPHILS RELATIVE PERCENT: 1.3 %
GFR AFRICAN AMERICAN: >60
GFR NON-AFRICAN AMERICAN: >60
GLOBULIN: 3.2 G/DL
GLUCOSE BLD-MCNC: 105 MG/DL (ref 70–99)
HCT VFR BLD CALC: 47.6 % (ref 40.5–52.5)
HEMOGLOBIN: 16.3 G/DL (ref 13.5–17.5)
KEPPRA DOSE AMT: ABNORMAL
KEPPRA: <2 UG/ML (ref 6–46)
LACTIC ACID: 4.1 MMOL/L (ref 0.4–2)
LYMPHOCYTES ABSOLUTE: 1.5 K/UL (ref 1–5.1)
LYMPHOCYTES RELATIVE PERCENT: 18.4 %
Lab: ABNORMAL
MAGNESIUM: 2.2 MG/DL (ref 1.8–2.4)
MCH RBC QN AUTO: 30.1 PG (ref 26–34)
MCHC RBC AUTO-ENTMCNC: 34.1 G/DL (ref 31–36)
MCV RBC AUTO: 88.1 FL (ref 80–100)
METHADONE SCREEN, URINE: ABNORMAL
MONOCYTES ABSOLUTE: 0.6 K/UL (ref 0–1.3)
MONOCYTES RELATIVE PERCENT: 7 %
NEUTROPHILS ABSOLUTE: 5.8 K/UL (ref 1.7–7.7)
NEUTROPHILS RELATIVE PERCENT: 72.4 %
OPIATE SCREEN URINE: ABNORMAL
OXYCODONE URINE: ABNORMAL
PDW BLD-RTO: 14.1 % (ref 12.4–15.4)
PH UA: 7
PHENCYCLIDINE SCREEN URINE: ABNORMAL
PLATELET # BLD: 224 K/UL (ref 135–450)
PMV BLD AUTO: 8.8 FL (ref 5–10.5)
POTASSIUM REFLEX MAGNESIUM: 3.2 MMOL/L (ref 3.5–5.1)
PROPOXYPHENE SCREEN: ABNORMAL
RBC # BLD: 5.41 M/UL (ref 4.2–5.9)
SODIUM BLD-SCNC: 141 MMOL/L (ref 136–145)
TOTAL PROTEIN: 7.6 G/DL (ref 6.4–8.2)
WBC # BLD: 8.1 K/UL (ref 4–11)

## 2021-06-30 PROCEDURE — 80053 COMPREHEN METABOLIC PANEL: CPT

## 2021-06-30 PROCEDURE — 85025 COMPLETE CBC W/AUTO DIFF WBC: CPT

## 2021-06-30 PROCEDURE — 83605 ASSAY OF LACTIC ACID: CPT

## 2021-06-30 PROCEDURE — 2580000003 HC RX 258: Performed by: NURSE PRACTITIONER

## 2021-06-30 PROCEDURE — 99285 EMERGENCY DEPT VISIT HI MDM: CPT

## 2021-06-30 PROCEDURE — 96374 THER/PROPH/DIAG INJ IV PUSH: CPT

## 2021-06-30 PROCEDURE — 6370000000 HC RX 637 (ALT 250 FOR IP): Performed by: NURSE PRACTITIONER

## 2021-06-30 PROCEDURE — 80307 DRUG TEST PRSMV CHEM ANLYZR: CPT

## 2021-06-30 PROCEDURE — 6360000002 HC RX W HCPCS: Performed by: NURSE PRACTITIONER

## 2021-06-30 PROCEDURE — 36415 COLL VENOUS BLD VENIPUNCTURE: CPT

## 2021-06-30 PROCEDURE — 83735 ASSAY OF MAGNESIUM: CPT

## 2021-06-30 PROCEDURE — 80177 DRUG SCRN QUAN LEVETIRACETAM: CPT

## 2021-06-30 RX ORDER — LEVETIRACETAM 10 MG/ML
1000 INJECTION INTRAVASCULAR ONCE
Status: COMPLETED | OUTPATIENT
Start: 2021-06-30 | End: 2021-06-30

## 2021-06-30 RX ORDER — POTASSIUM CHLORIDE 1.5 G/1.77G
40 POWDER, FOR SOLUTION ORAL ONCE
Status: DISCONTINUED | OUTPATIENT
Start: 2021-06-30 | End: 2021-06-30

## 2021-06-30 RX ORDER — 0.9 % SODIUM CHLORIDE 0.9 %
1000 INTRAVENOUS SOLUTION INTRAVENOUS ONCE
Status: COMPLETED | OUTPATIENT
Start: 2021-06-30 | End: 2021-06-30

## 2021-06-30 RX ADMIN — LEVETIRACETAM 1000 MG: 10 INJECTION INTRAVENOUS at 17:33

## 2021-06-30 RX ADMIN — POTASSIUM BICARBONATE 40 MEQ: 782 TABLET, EFFERVESCENT ORAL at 19:12

## 2021-06-30 RX ADMIN — SODIUM CHLORIDE 1000 ML: 9 INJECTION, SOLUTION INTRAVENOUS at 16:22

## 2021-06-30 ASSESSMENT — PAIN SCALES - GENERAL
PAINLEVEL_OUTOF10: 0
PAINLEVEL_OUTOF10: 0

## 2021-06-30 NOTE — ED TRIAGE NOTES
Pt arrived to dept via EMS from home where he lives with his caregiver. Pt c/o seizure at home that was witnessed by his caregiver. Pt states that he felt an aura coming on and told his caregiver who lowered him to the floor before it could start. Pt denies hitting his head. Pt reports intermittently taking his Keppra because he does not like to take pills. Hx of CVA with left sided deficits which is why he has a caregiver. Pt awake, alert and oriented x 3. Skin warm and dry/normal color for ethnicity. Resp easy and unlabored. Pt placed in gown and on cardiac monitor. Call light in reach. Will continue to monitor.

## 2021-06-30 NOTE — ED NOTES
Pt's SpO2 88% on room air while at rest in bed. Pt placed on 2 l/min nasal cannula and SpO2 came up to 96%. Dr. Dayana Carlton aware.       [facility], RN  06/30/21 8297 [facility], RN  06/30/21 5758

## 2021-06-30 NOTE — ED NOTES
Bed: D-38  Expected date:   Expected time:   Means of arrival:   Comments:  Santiago Cline RN  06/30/21 1286

## 2021-06-30 NOTE — ED PROVIDER NOTES
I independently evaluated and obtained a history and physical on [address]. All diagnostic, treatment, and disposition assistants were made to myself in conjunction the advanced practice provider. For further details of this patient's emergency department encounter, please see the advanced practice provider's documentation. History: Patient is a 51-year-old history of seizures has not been compliant with his Keppra has frequent seizure breakthrough. The last 1 was today lasted about 2 minutes and was witnessed by a friend. Patient is no longer postictal on arrival.  No incontinence. Patient has no head injury. Patient has been noncompliant with Keppra been taking it intermittently. She has history of stroke in the past as well as status epilepticus. Patient seen in conjunction with [facility]. He was brought by EMS. Physician Exam: He is chronically ill looking a 51-year-old with normal vital signs except for mild tachycardia about 110. He is moving all extremities no signs of trauma pupils equal and reactive to light speech was normal lungs were clear to auscultation heart regular rhythm with elevated rate. No evidence of any trauma. Has some hemiparesis and decreased neurological function secondary to the CVA on the left side.   Labs were as follows           Labs Reviewed   LEVETIRACETAM LEVEL - Abnormal; Notable for the following components:       Result Value    Levetiracetam Lvl <2.0 (*)     All other components within normal limits    Narrative:     Performed at:  [address] [address]   Phone [phone]   COMPREHENSIVE METABOLIC PANEL W/ REFLEX TO MG FOR LOW K - Abnormal; Notable for the following components:    Potassium reflex Magnesium 3.2 (*)     Anion Gap 17 (*)     Glucose 105 (*)     BUN 4 (*)     ALT 6 (*)     AST 11 (*)     All other components within normal limits    Narrative:     Performed at:  [facility]  [address] [facility] [place] [address]   Phone [phone]   LACTIC ACID, PLASMA - Abnormal; Notable for the following components:    Lactic Acid 4.1 (*)     All other components within normal limits    Narrative:     Naty Reeves tel. [phone],  Chemistry results called to and read back by Blaze Keane RN. , 06/30/2021  17:18, by Best Salgado  Performed at:  [facility]  [address] [facility] [place] [address]   Phone [phone]   URINE DRUG SCREEN - Abnormal; Notable for the following components:    Cannabinoid Scrn, Ur POSITIVE (*)     All other components within normal limits    Narrative:     Performed at:  [facility]  [address] [facility] [place] [address]   Phone [phone]   CBC WITH AUTO DIFFERENTIAL    Narrative:     Performed at:  [facility]  [address] [email or web address] [address]   Phone [phone]   MAGNESIUM    Narrative:     Performed at:  [facility]  [address] [place] [address]   Phone [phone]     Patient is a 61-year-old seizure patient was loaded up with Keppra and educated on seizure medication and compliance. Patient will be discharged after all labs are back and he is feeling better. Patient potassium was low at 3.2 it was replaced with oral potassium. Patient magnesium was also replaced. Patient has no further episode of seizures his Keppra level was low he was bolused with IV Keppra. Patient will encouraged to take more of the medication and discharged home. Diagnosis :  witnessed seizure.   Low magnesium level      Rickie Moctezuma MD  06/30/21 5650       Rickie Moctezuma MD  07/01/21 1422       Rickie Moctezuma MD  07/03/21 0167

## 2021-06-30 NOTE — ED NOTES
Spoke with pt's caregiver/friend Reena Barillas to update on pt condition with pt's permission. Informed that the pt's Keppra levels were low and he is receiving Keppra at this time. Alexia Aguirre is unable to pick the pt up from the ED when he is discharged and has requested medical transport.       Karen Langston RN  06/30/21 4971

## 2021-06-30 NOTE — ED NOTES
Removed the pt from supplementary O2 and SpO2 remains at 95% on room air.       Tatiana Rubio RN  06/30/21 0408

## 2021-06-30 NOTE — ED NOTES
Spoke with the pt's friend/caregiver to update on pt condition and the fact that the pt does not want medical transport because of the bill. Informed that the pt has requested a Lyft and will have the  honk the horn when he arrives so Sadiq Velazquez can come out with the wheelchair to help him inside.       Yossi Rivas RN  06/30/21 9080

## 2021-06-30 NOTE — ED PROVIDER NOTES
[facility] Emergency Department    CHIEF COMPLAINT  Seizures (Pt with a 2 min witnesed seizure. Didn't hit his head. Intermittently takes his Keppra. )      SHARED SERVICE VISIT  I have seen and evaluated this patient with my supervising physician, Dr. Ivonne Sanchez is a [address] y.o. well-appearing male with a medical history including, but not limited to, seizure disorder, CAD, CVA, and nicotine addiction who is in no apparent distress who presents to the ED complaining of a witnessed seizure at 1400 hrs. He states that he felt a seizure coming on and told his caregiver who had him lie down for. The seizure was witnessed by his caregiver who reports the duration was 2 minutes. Patient is presently awake, alert, and oriented x4. He does have a baseline left-sided deficits status post previous CVA. He denies headache presently-resolved, neck pain, back pain, other injuries, tongue/pain. He does endorse that he is missed doses of his Keppra over the past 2 or 3 days as a \"does not like taking pills\". No other complaints, modifying factors or associated symptoms. Nursing notes reviewed. Past Medical History:   Diagnosis Date    CAD (coronary artery disease)     CVA (cerebral vascular accident) ([facility] [address]     Nicotine addiction      Past Surgical History:   Procedure Laterality Date    CARDIAC CATHETERIZATION  2010     No family history on file.   Social History     Socioeconomic History    Marital status: Single     Spouse name: Not on file    Number of children: Not on file    Years of education: Not on file    Highest education level: Not on file   Occupational History    Not on file   Tobacco Use    Smoking status: Current Every Day Smoker     Packs/day: 1.00    Smokeless tobacco: Never Used   Substance and Sexual Activity    Alcohol use: Yes     Comment: occasionally     Drug use: Never    Sexual activity: Not Currently   Other Topics Concern    Not on file   Social History Narrative    Not on file     Social Determinants of Health     Financial Resource Strain:     Difficulty of Paying Living Expenses:    Food Insecurity:     Worried About Running Out of Food in the Last Year:     [address] in the Last Year:    Transportation Needs:     Lack of Transportation (Medical):      Lack of Transportation (Non-Medical):    Physical Activity:     Days of Exercise per Week:     Minutes of Exercise per Session:    Stress:     Feeling of Stress :    Social Connections:     Frequency of Communication with Friends and Family:     Frequency of Social Gatherings with Friends and Family:     Attends Christianity Services:     Active Member of Clubs or Organizations:     Attends Club or Organization Meetings:     Marital Status:    Intimate Partner Violence:     Fear of Current or Ex-Partner:     Emotionally Abused:     Physically Abused:     Sexually Abused:      Current Facility-Administered Medications   Medication Dose Route Frequency Provider Last Rate Last Admin    0.9 % sodium chloride bolus  1,000 mL Intravenous Once Ursula Mello, APRN - CNP         Current Outpatient Medications   Medication Sig Dispense Refill    levETIRAcetam (KEPPRA) 500 MG tablet Take 2 tablets by mouth 2 times daily 60 tablet 3    potassium chloride (KLOR-CON M) 20 MEQ extended release tablet Take 2 tablets by mouth daily for 14 days 60 tablet 3    thiamine 100 MG tablet Take 1 tablet by mouth daily 30 tablet 3    Magnesium Oxide (MAGNESIUM OXIDE 400) 240 MG PACK Take 400 mg by mouth daily for 14 days 30 each 0    metoprolol tartrate (LOPRESSOR) 25 MG tablet Take 25 mg by mouth 2 times daily      rosuvastatin (CRESTOR) 10 MG tablet Take 10 mg by mouth daily      traZODone (DESYREL) 50 MG tablet Take  mg by mouth nightly      albuterol sulfate HFA (VENTOLIN HFA) 108 (90 Base) MCG/ACT inhaler Inhale 2 puffs into the lungs every 6 hours as needed for Wheezing      aspirin 81 MG chewable tablet Take 81 mg by mouth daily      clopidogrel (PLAVIX) 75 MG tablet Take 1 tablet by mouth daily       nitroGLYCERIN (NITROSTAT) 0.4 MG SL tablet Place 0.4 mg under the tongue every 5 minutes as needed for Chest pain       No Known Allergies    REVIEW OF SYSTEMS  10 systems reviewed, pertinent positives per HPI otherwise noted to be negative    PHYSICAL EXAM  BP (!) 145/109   Pulse 113   Temp 97.8 °F (36.6 °C) (Oral)   Resp 22   Ht 5' 6\" (1.676 m)   Wt 126 lb 15.8 oz (57.6 kg)   SpO2 95%   BMI 20.50 kg/m²   GENERAL APPEARANCE: Awake and alert. Cooperative. No apparent distress. Non-- toxic in appearance. HEAD: Normocephalic. Atraumatic. EYES: PERRL. EOM's grossly intact. ENT: Mucous membranes are moist.  No tongue laceration. Teeth intact. NECK: Supple. There is no midline cervical spine tenderness on palpation. HEART: RRR. No murmurs, rubs, or gallops.  + S1-S2  LUNGS: Respirations unlabored. CTAB. Good air exchange. Speaking comfortably in full sentences. ABDOMEN: Soft. Non-distended. Non-tender. No guarding or rebound.  + Bowel sounds x 4 quadrants. No masses. No organomegaly. EXTREMITIES: No peripheral edema. Moves right upper and lower extremities with 5/5 strength.  + Brisk cap refill of the bilateral upper and lower extremities. + Full sensation to right upper and lower extremities. Bilateral upper and lower extremities are warm, pink, dry, well-perfused, and otherwise neurovascularly intact. SKIN: Warm and dry. No acute rashes. NEUROLOGICAL: Alert and oriented. CN's 2-12 intact. No gross facial drooping. Strength 5/5, sensation intact. PSYCHIATRIC: Normal mood and affect. NIH Stroke Scale  NIH Stroke Scale Assessed: Yes  Interval: Baseline  Level of Consciousness (1a. ): Responds only with reflex motor or autonomic effects or totally unresponsive  LOC Questions (1b. ):  Answers both correctly  LOC Commands (1c. ): Performs both tasks correctly  Best Gaze (2. ): Normal  Visual (3. ): No visual loss  Facial Palsy (4. ): Normal symmetrical movement  Motor Arm, Left (5a. ): No effort against gravity, limb falls (S/p CVA)  Motor Arm, Right (5b. ): No drift  Motor Leg, Left (6a. ): No effort against gravity, limb falls  Motor Leg, Right (6b. ): No drift  Limb Ataxia (7. ): (!) Present in two limbs  Sensory (8. ): (!) Severe to total loss (S/p CVA)  Best Language (9. ): No aphasia  Dysarthria (10. ): Normal  Extinction and Inattention (11): No abnormality  Total: 12     Patient is status post CVA and has baseline left-sided deficits. RADIOLOGY  No results found. ED COURSE  Patient did not require analgesia while in the emergency department. Triage vitals stable but hypertensive at 145/109 mmHg and with a tachycardic rate of 113 and [email or web address] bpm.  A basic workup was initiated including CBC, CMP, lactic acid, UDS, levetiracetam level.            Labs Ordered:  I have reviewed and interpreted all of the currently available lab results from this visit:  Results for orders placed or performed during the hospital encounter of 06/30/21   Levetiracetam Level   Result Value Ref Range    Levetiracetam Lvl <2.0 (L) 6.0 - 46.0 ug/mL    KEPPRA Dose Amt Unknown    CBC Auto Differential   Result Value Ref Range    WBC 8.1 4.0 - 11.0 K/uL    RBC 5.41 4.20 - 5.90 M/uL    Hemoglobin 16.3 13.5 - 17.5 g/dL    Hematocrit 47.6 40.5 - 52.5 %    MCV 88.1 80.0 - 100.0 fL    MCH 30.1 26.0 - 34.0 pg    MCHC 34.1 31.0 - 36.0 g/dL    RDW 14.1 12.4 - 15.4 %    Platelets 885 780 - 821 K/uL    MPV 8.8 5.0 - 10.5 fL    Neutrophils % 72.4 %    Lymphocytes % 18.4 %    Monocytes % 7.0 %    Eosinophils % 1.3 %    Basophils % 0.9 %    Neutrophils Absolute 5.8 1.7 - 7.7 K/uL    Lymphocytes Absolute 1.5 1.0 - 5.1 K/uL    Monocytes Absolute 0.6 0.0 - 1.3 K/uL    Eosinophils Absolute 0.1 0.0 - 0.6 K/uL    Basophils Absolute 0.1 0.0 - 0.2 K/uL   Comprehensive Metabolic Panel w/ Reflex to MG   Result Value Ref Range    Sodium 141 136 - 145 mmol/L    Potassium reflex Magnesium 3.2 (L) 3.5 - 5.1 mmol/L    Chloride 103 99 - 110 mmol/L    CO2 21 21 - 32 mmol/L    Anion Gap 17 (H) 3 - 16    Glucose 105 (H) 70 - 99 mg/dL    BUN 4 (L) 7 - 20 mg/dL    CREATININE 0.9 0.9 - 1.3 mg/dL    GFR Non-African American >60 >60    GFR African American >60 >60    Calcium 10.2 8.3 - 10.6 mg/dL    Total Protein 7.6 6.4 - 8.2 g/dL    Albumin 4.4 3.4 - 5.0 g/dL    Albumin/Globulin Ratio 1.4 1.1 - 2.2    Total Bilirubin 1.0 0.0 - 1.0 mg/dL    Alkaline Phosphatase 119 40 - 129 U/L    ALT 6 (L) 10 - 40 U/L    AST 11 (L) 15 - 37 U/L    Globulin 3.2 g/dL   Lactic Acid, Plasma   Result Value Ref Range    Lactic Acid 4.1 (HH) 0.4 - 2.0 mmol/L   Urine Drug Screen   Result Value Ref Range    Amphetamine Screen, Urine Neg Negative <1000ng/mL    Barbiturate Screen, Ur Neg Negative <200 ng/mL    Benzodiazepine Screen, Urine Neg Negative <200 ng/mL    Cannabinoid Scrn, Ur POSITIVE (A) Negative <50 ng/mL    Cocaine Metabolite Screen, Urine Neg Negative <300 ng/mL    Opiate Scrn, Ur Neg Negative <300 ng/mL    PCP Screen, Urine Neg Negative <25 ng/mL    Methadone Screen, Urine Neg Negative <300 ng/mL    Propoxyphene Scrn, Ur Neg Negative <300 ng/mL    Oxycodone Urine Neg Negative <100 ng/ml    pH, UA 7.0     Drug Screen Comment: see below    Magnesium   Result Value Ref Range    Magnesium 2.20 1.80 - 2.40 mg/dL           Imaging ordered:  No results found. Reevaluation:  Patient resting comfortably on stretcher with eyes open with stable vital signs. Seizure precautions remain in place. A discussion was had with Mr. Lory Jewell regarding seizure, noncompliance with medication regimen, hypokalemia, and intention to discharge. Risk management discussed and shared decision making had with patient and/or surrogate. All questions were answered. Patient will follow up with his PCP for further evaluation/treatment. close outpatient follow-up with PCP in the next 1-2 days. Patient is instructed to return to the emergency department immediately for new or worsening symptoms including, but not limited to, severe headache accompanied by confusion, slurred speech, right-sided weakness (worsening of your baseline left-sided weakness/paralysis), facial droop, or other concerns. .  Patient verbalizes understanding and is agreeable with plan for discharge and follow-up. Clinical Impression:  Witnessed seizure  Noncompliance with medication regimen-Keppra  Hypokalemia      DISPOSITION  Discharged        Lori Schaefer CNP   [facility]    This chart was created using Dragon dictation. Every effort was made by myself to ensure accuracy, however due to limitations of this technology errors may be present.       TELMA Augustin - EVETTE  07/03/21 [id number]

## 2021-07-01 NOTE — ED NOTES
Pt is insistent that he does not want medical transport and that he can walk from a wheelchair to a Lyft and then from the [place] to a wheelchair if his caregiver Jamarcus Salcedo brings it out to him. Jamarcus Salcedo called and notified of pt being discharged.       [address], RN  06/30/21 2004

## 2021-07-01 NOTE — ED NOTES
Discharge and education instructions reviewed. Patient verbalized understanding, teach-back successful. Patient denied questions at this time. No acute distress noted. Patient instructed to follow-up as noted - return to emergency department if symptoms worsen. Patient verbalized understanding. Discharged per EDMD with discharge instructions. Lyft requested and pt informed of make and model of the vehicle as well as the name of the  and then wheeled to the ED exit.       Yossi Rivas RN  06/30/21 2032

## 2023-07-20 NOTE — CARE COORDINATION
[id number] [facility] [phone]  [address] [place] [id number]  Phone: [phone] Fax: [phone]      Potential assistance Purchasing Medications:    Does Patient want to participate in local refill/ meds to beds program?:      Meds To Beds General Rules:  1. Can ONLY be done Monday- Friday between 8:30am-5pm  2. Prescription(s) must be in pharmacy by 3pm to be filled same day  3. Copy of patient's insurance/ prescription drug card and patient face sheet must be sent along with the prescription(s)  4. Cost of Rx cannot be added to hospital bill. If financial assistance is needed, please contact unit  or ;  or  CANNOT provide pharmacy voucher for patients co-pays  5.  Patients can then  the prescription on their way out of the hospital at discharge, or pharmacy can deliver to the bedside if staff is available. (payment due at time of pick-up or delivery - cash, check, or card accepted)     Able to afford home medications/ co-pay costs: Yes    ADLS  Support Systems:      PT AM-PAC:   /24  OT AM-PAC:   /24    [place]: mobile home  Steps: 3 steps    Plans to RETURN to current housing: Yes  Barriers to RETURNING to current housing: none noted      DISCHARGE PLAN:  Disposition: TBD    Transportation PLAN for discharge: family     Factors facilitating achievement of predicted outcomes: Family support and Friend support    Barriers to discharge: currently intubated and sedated    Additional Case Management Notes: NA    The Plan for Transition of Care is related to the following treatment goals of [facility] [G40.901]    The Patient and/or patient representative Alfreda Holstein and his family were provided with a choice of provider and agrees with the discharge plan Not Indicated    Freedom of choice list was provided with basic dialogue that supports the patient's individualized plan of care/goals and shares the quality data associated with the providers.  Not Indicated    Care Transition patient: Hallie Gutierrez RN  [facility]  Case Management Department  Ph: [phone]   Fax: [phone]